# Patient Record
Sex: FEMALE | Race: WHITE | NOT HISPANIC OR LATINO | Employment: FULL TIME | URBAN - METROPOLITAN AREA
[De-identification: names, ages, dates, MRNs, and addresses within clinical notes are randomized per-mention and may not be internally consistent; named-entity substitution may affect disease eponyms.]

---

## 2019-10-24 ENCOUNTER — OFFICE VISIT (OUTPATIENT)
Dept: OBSTETRICS AND GYNECOLOGY | Facility: CLINIC | Age: 29
End: 2019-10-24
Payer: COMMERCIAL

## 2019-10-24 VITALS
SYSTOLIC BLOOD PRESSURE: 104 MMHG | HEIGHT: 62 IN | BODY MASS INDEX: 40.16 KG/M2 | DIASTOLIC BLOOD PRESSURE: 80 MMHG | WEIGHT: 218.25 LBS

## 2019-10-24 DIAGNOSIS — N90.7 LABIAL CYST: Primary | ICD-10-CM

## 2019-10-24 PROCEDURE — 99999 PR PBB SHADOW E&M-NEW PATIENT-LVL III: CPT | Mod: PBBFAC,,, | Performed by: NURSE PRACTITIONER

## 2019-10-24 PROCEDURE — 99203 OFFICE O/P NEW LOW 30 MIN: CPT | Mod: S$GLB,,, | Performed by: NURSE PRACTITIONER

## 2019-10-24 PROCEDURE — 99999 PR PBB SHADOW E&M-NEW PATIENT-LVL III: ICD-10-PCS | Mod: PBBFAC,,, | Performed by: NURSE PRACTITIONER

## 2019-10-24 PROCEDURE — 99203 PR OFFICE/OUTPT VISIT, NEW, LEVL III, 30-44 MIN: ICD-10-PCS | Mod: S$GLB,,, | Performed by: NURSE PRACTITIONER

## 2019-10-24 PROCEDURE — 3008F BODY MASS INDEX DOCD: CPT | Mod: CPTII,S$GLB,, | Performed by: NURSE PRACTITIONER

## 2019-10-24 PROCEDURE — 3008F PR BODY MASS INDEX (BMI) DOCUMENTED: ICD-10-PCS | Mod: CPTII,S$GLB,, | Performed by: NURSE PRACTITIONER

## 2019-10-24 NOTE — PROGRESS NOTES
Chief Complaint   Patient presents with    Vaginal Pain       History of Present Illness: Radha Kee is a 29 y.o. female that presents today 10/24/2019   Pt [facility] c/o vaginal pain and swelling x 3 days. She denies that anything occurred prior to the pain and swelling. Her  has been out of town for 2 weeks, so she has not been sexually active. She denies any vaginal itching, burning, discharge or odor. No other complaints or concerns noted.       Past Medical History:   Diagnosis Date    Allergy     Migraine headache        Past Surgical History:   Procedure Laterality Date    BUNIONECTOMY      bilateral     BUNIONECTOMY      both feet    bunions  2005       Current Outpatient Medications   Medication Sig Dispense Refill    azithromycin (ZITHROMAX Z-ELVIS) 250 MG tablet Take 2 tabs the first day and 1 tab qd thereafter. 6 tablet 0    norethindrone-ethinyl estradiol (JUNEL FE 1/20, 28,) 1 mg-20 mcg (21)/75 mg (7) per tablet Take 1 tablet by mouth once daily. 84 tablet 4    pseudoephedrine-guaifenesin  mg (MUCINEX D)  mg per tablet Take 1 tablet by mouth 2 (two) times daily.       No current facility-administered medications for this visit.        Review of patient's allergies indicates:   Allergen Reactions    Bactrim [sulfamethoxazole-trimethoprim] Hives     Other reaction(s): Hives    Penicillins      Other reaction(s): Hives    Sulfa (sulfonamide antibiotics)        Family History   Problem Relation Age of Onset    Hypertension Mother     Liver disease Father     Cataracts Father     Amenorrhea Sister     Cancer Maternal Grandmother     Breast cancer Maternal Grandmother         dx age 50's or 60's    Breast cancer Other 60        paternal great aunt    Acne Brother     Diabetes Maternal Grandfather     Cancer Maternal Grandfather         skin    Hypertension Maternal Grandfather     Stroke Paternal Grandfather     Diabetes Paternal  "Grandmother     Hypertension Paternal Grandmother     Amblyopia Neg Hx     Blindness Neg Hx     Glaucoma Neg Hx     Macular degeneration Neg Hx     Retinal detachment Neg Hx     Strabismus Neg Hx     Thyroid disease Neg Hx     Colon cancer Neg Hx     Ovarian cancer Neg Hx     Psoriasis Neg Hx     Lupus Neg Hx     Eczema Neg Hx        Social History     Tobacco Use    Smoking status: Never Smoker   Substance Use Topics    Alcohol use: Yes     Comment: social    Drug use: No       OB History    Para Term  AB Living   0 0 0 0 0 0   SAB TAB Ectopic Multiple Live Births   0 0 0 0         Review of Symptoms:  GENERAL: Denies weight gain or weight loss. Feeling well overall.   SKIN: Denies rash or lesions.   HEAD: Denies head injury or headache.   NODES: Denies enlarged lymph nodes.   CHEST: Denies chest pain or shortness of breath.   CARDIOVASCULAR: Denies palpitations or left sided chest pain.   ABDOMEN: No abdominal pain, constipation, diarrhea, nausea, vomiting or rectal bleeding.   URINARY: No frequency, dysuria, hematuria, or burning on urination.    /80   Ht 5' 2.01" (1.575 m)   Wt 99 kg (218 lb 4.1 oz)   LMP 10/24/2019   Physical Exam:  APPEARANCE: Well nourished, well developed, in no acute distress.  SKIN: Normal skin turgor, no lesions.  NECK: Neck symmetric without masses   RESPIRATORY: Normal respiratory effort with no retractions or use of accessory muscles  ABDOMEN: Soft. No tenderness or masses. No hepatosplenomegaly. No hernias.  PELVIC: Normal external female genitalia without lesions; small approx 2cm cyst noted on right labia minora - oozing yellow/brown exudate with minimal pressure applied. Normal hair distribution. Adequate perineal body, normal urethral meatus. Urethra with no masses.  Bladder nontender. Vagina moist and well rugated without lesions or discharge. Cervix pink and without lesions. No significant cystocele or rectocele. "     ASSESSMENT/PLAN:  Labial cyst      -D/w pt that area of discomfort is a labial cyst and has already started to resolve. Exudate drained from cyst with minimal pressure. Cyst should completely resolve on its own. If it does not resolve or worsens please f/u. Pt verbalized understanding.    Follow-up:  RTC if symptoms worsen or do not improve  RTC as needed

## 2019-10-24 NOTE — LETTER
October 24, 2019      Charmaine Frost PA-C  [address]  [facility] [address]           [facility] [address]  [address]  [facility] [address]  Phone: [phone]  Fax: [phone]          Patient: Radha Kee   MR Number: [id number]   YOB: 1990   Date of Visit: 10/24/2019       Dear Charmaine Frost:    Thank you for referring Radha Kee to me for evaluation. Attached you will find relevant portions of my assessment and plan of care.    If you have questions, please do not hesitate to call me. I look forward to following Radha Kee along with you.    Sincerely,    Elham Hernandez, NP    Enclosure  CC:  No Recipients    If you would like to receive this communication electronically, please contact [email or web address] or [phone] to request more information on [employer] access.    For providers and/or their staff who would like to refer a patient to [facility], please contact us through our one-stop-shop provider referral line, [facility], at [phone].    If you feel you have received this communication in error or would no longer like to receive these types of communications, please e-mail [email or web address]

## 2020-04-21 DIAGNOSIS — Z01.84 ANTIBODY RESPONSE EXAMINATION: ICD-10-CM

## 2020-05-20 ENCOUNTER — OFFICE VISIT (OUTPATIENT)
Dept: OBSTETRICS AND GYNECOLOGY | Facility: CLINIC | Age: 30
End: 2020-05-20
Payer: COMMERCIAL

## 2020-05-20 VITALS
WEIGHT: 227.94 LBS | SYSTOLIC BLOOD PRESSURE: 122 MMHG | HEIGHT: 62 IN | DIASTOLIC BLOOD PRESSURE: 78 MMHG | BODY MASS INDEX: 41.94 KG/M2

## 2020-05-20 DIAGNOSIS — Z01.419 ENCOUNTER FOR GYNECOLOGICAL EXAMINATION WITHOUT ABNORMAL FINDING: Primary | ICD-10-CM

## 2020-05-20 DIAGNOSIS — Z12.4 PAP SMEAR FOR CERVICAL CANCER SCREENING: ICD-10-CM

## 2020-05-20 PROCEDURE — 99999 PR PBB SHADOW E&M-EST. PATIENT-LVL III: ICD-10-PCS | Mod: PBBFAC,,, | Performed by: OBSTETRICS & GYNECOLOGY

## 2020-05-20 PROCEDURE — 99395 PR PREVENTIVE VISIT,EST,18-39: ICD-10-PCS | Mod: S$GLB,,, | Performed by: OBSTETRICS & GYNECOLOGY

## 2020-05-20 PROCEDURE — 88175 CYTOPATH C/V AUTO FLUID REDO: CPT

## 2020-05-20 PROCEDURE — 87624 HPV HI-RISK TYP POOLED RSLT: CPT

## 2020-05-20 PROCEDURE — 99395 PREV VISIT EST AGE 18-39: CPT | Mod: S$GLB,,, | Performed by: OBSTETRICS & GYNECOLOGY

## 2020-05-20 PROCEDURE — 99999 PR PBB SHADOW E&M-EST. PATIENT-LVL III: CPT | Mod: PBBFAC,,, | Performed by: OBSTETRICS & GYNECOLOGY

## 2020-05-20 NOTE — PROGRESS NOTES
Subjective:       Patient ID: Radha Kee is a 29 y.o. female.    Chief Complaint:  Well Woman and hx abnormal pap      History of Present Illness  HPI    Radha Kee is a 29 y.o. female  NEW TO ME here for her annual GYN exam.  She reports having had an abnormal Pap with + HR HPV last year which did not require treatment, did have a colposcopy.  She reports having had the Gardasil vaccine series as a teenager.    She describes her periods as regular, about every 35 days, normal flow, lasting 4 days.   denies break through bleeding.   denies vaginal itching or irritation.  Denies vaginal discharge.  She is sexually active. She has had 1 partner for about 14 years .(currently , but trying to work things out).  She uses no method for contraception.   History of abnormal pap: Yes -   Last Pap: approximate date  and was abnormal: LGSIL, + HPV  denies domestic violence. She does feel safe at home.     Past Medical History:   Diagnosis Date    Allergy     Migraine headache     without aura     Past Surgical History:   Procedure Laterality Date    BUNIONECTOMY      bilateral      Social History     Socioeconomic History    Marital status: Single     Spouse name: Not on file    Number of children: Not on file    Years of education: Not on file    Highest education level: Not on file   Occupational History    Occupation: Student   Social Needs    Financial resource strain: Not on file    Food insecurity:     Worry: Not on file     Inability: Not on file    Transportation needs:     Medical: Not on file     Non-medical: Not on file   Tobacco Use    Smoking status: Never Smoker    Smokeless tobacco: Never Used   Substance and Sexual Activity    Alcohol use: Yes     Alcohol/week: 1.0 standard drinks     Types: 1 Glasses of wine per week     Comment: social    Drug use: No    Sexual activity: Yes     Partners: Male     Birth control/protection: Condom     Comment: condom use  "occasional;current partner (ex ) x 14 years   Lifestyle    Physical activity:     Days per week: Not on file     Minutes per session: Not on file    Stress: Not on file   Relationships    Social connections:     Talks on phone: Not on file     Gets together: Not on file     Attends Mandaeism service: Not on file     Active member of club or organization: Not on file     Attends meetings of clubs or organizations: Not on file     Relationship status: Not on file   Other Topics Concern    Are you pregnant or think you may be? No    Breast-feeding No   Social History Narrative    Not on file     Family History   Problem Relation Age of Onset    Hypertension Mother     Liver disease Father     Cataracts Father     Amenorrhea Sister     Lymphoma Maternal Grandmother     Breast cancer Other 60        paternal great aunt    Acne Brother     Diabetes Maternal Grandfather     Cancer Maternal Grandfather         skin    Hypertension Maternal Grandfather     Stroke Paternal Grandfather     Heart attack Paternal Grandfather 50    Diabetes Maternal Aunt     Hypertension Maternal Aunt     Diabetes Maternal Uncle     Hypertension Maternal Uncle     Diabetes Maternal Aunt     Hypertension Maternal Aunt     Amblyopia Neg Hx     Blindness Neg Hx     Glaucoma Neg Hx     Macular degeneration Neg Hx     Retinal detachment Neg Hx     Strabismus Neg Hx     Thyroid disease Neg Hx     Colon cancer Neg Hx     Ovarian cancer Neg Hx     Psoriasis Neg Hx     Lupus Neg Hx     Eczema Neg Hx      OB History        0    Para   0    Term   0       0    AB   0    Living   0       SAB   0    TAB   0    Ectopic   0    Multiple   0    Live Births                     /78   Ht 5' 2" (1.575 m)   Wt 103.4 kg (227 lb 15.3 oz)   LMP 2020 (Approximate)   BMI 41.69 kg/m²         GYN & OB History  Patient's last menstrual period was 2020 (approximate).   Date of Last Pap: " 2013    OB History    Para Term  AB Living   0 0 0 0 0 0   SAB TAB Ectopic Multiple Live Births   0 0 0 0         Review of Systems  Review of Systems   Constitutional: Negative for activity change, appetite change, fatigue and unexpected weight change.   HENT: Negative.    Eyes: Negative for visual disturbance.   Respiratory: Negative for shortness of breath and wheezing.    Cardiovascular: Negative for chest pain, palpitations and leg swelling.   Gastrointestinal: Negative for abdominal pain, bloating and blood in stool.   Endocrine: Negative for diabetes and hair loss.   Genitourinary: Negative for decreased libido, dyspareunia, menorrhagia and menstrual problem.   Musculoskeletal: Negative for back pain and joint swelling.   Integumentary:  Negative for acne, hair changes and nipple discharge.   Neurological: Negative for headaches.   Hematological: Does not bruise/bleed easily.   Psychiatric/Behavioral: Negative for depression and sleep disturbance. The patient is not nervous/anxious.    Breast: Negative for mastodynia and nipple discharge          Objective:      Physical Exam:   Constitutional: She is oriented to person, place, and time. She appears well-developed and well-nourished.    HENT:   Head: Normocephalic and atraumatic.    Eyes: Pupils are equal, round, and reactive to light. EOM are normal.    Neck: Normal range of motion. Neck supple.    Cardiovascular: Normal rate and regular rhythm.     Pulmonary/Chest: Effort normal and breath sounds normal.   BREASTS:  no mass, no tenderness, no deformity and no retraction. Right breast exhibits no inverted nipple, no mass, no nipple discharge, no skin change, no tenderness, no bleeding and no swelling. Left breast exhibits no inverted nipple, no mass, no nipple discharge, no skin change, no tenderness, no bleeding and no swelling. Breasts are symmetrical.              Abdominal: Soft. Bowel sounds are normal.     Genitourinary: Pelvic exam  was performed with patient supine.   Genitourinary Comments: PELVIC: Normal external genitalia without lesions.  Normal hair distribution.  Adequate perineal body, normal urethral meatus.  Vagina moist and well rugated without lesions or discharge.  Cervix pink, without lesions, discharge or tenderness.  No significant cystocele or rectocele.  Bimanual exam shows uterus to be NOT PALPABLY ENLARGED, BUT DIFFICULT TO DELINEATE SECONDARY TO HABITUS, and nontender.  Adnexa without masses or tenderness.               Musculoskeletal: Normal range of motion and moves all extremeties.       Neurological: She is alert and oriented to person, place, and time.    Skin: Skin is warm and dry.    Psychiatric: She has a normal mood and affect.              Assessment:        1. Encounter for gynecological examination without abnormal finding    2. Pap smear for cervical cancer screening                Plan:        1. Encounter for gynecological examination without abnormal finding    COUNSELING:  The patient was counseled today on regular weight bearing exercise. Patient was counseled today on the new ACS guidelines for cervical cytology screening as well as the current recommendations for breast cancer screening. Counseling session lasted approximately 10 minutes, and all her questions were answered. She was advised to see her primary care physician for all other health maintenance.   FOLLOW-UP with me for next routine visit.         2. Pap smear for cervical cancer screening      - Liquid-Based Pap Smear, Screening  - HPV High Risk Genotypes, PCR       Follow up in about 1 year (around 5/20/2021).

## 2020-05-21 DIAGNOSIS — Z01.84 ANTIBODY RESPONSE EXAMINATION: ICD-10-CM

## 2020-05-25 LAB
FINAL PATHOLOGIC DIAGNOSIS: NORMAL
Lab: NORMAL

## 2020-05-27 LAB
HPV HR 12 DNA SPEC QL NAA+PROBE: POSITIVE
HPV16 AG SPEC QL: NEGATIVE
HPV18 DNA SPEC QL NAA+PROBE: NEGATIVE

## 2020-06-20 DIAGNOSIS — Z01.84 ANTIBODY RESPONSE EXAMINATION: ICD-10-CM

## 2020-07-20 DIAGNOSIS — Z01.84 ANTIBODY RESPONSE EXAMINATION: ICD-10-CM

## 2020-08-19 DIAGNOSIS — Z01.84 ANTIBODY RESPONSE EXAMINATION: ICD-10-CM

## 2020-09-01 ENCOUNTER — OFFICE VISIT (OUTPATIENT)
Dept: OPTOMETRY | Facility: CLINIC | Age: 30
End: 2020-09-01
Payer: COMMERCIAL

## 2020-09-01 DIAGNOSIS — H52.223 REGULAR ASTIGMATISM OF BOTH EYES: Primary | ICD-10-CM

## 2020-09-01 DIAGNOSIS — Z04.9 DISEASE RULED OUT AFTER EXAMINATION: ICD-10-CM

## 2020-09-01 PROCEDURE — 92004 COMPRE OPH EXAM NEW PT 1/>: CPT | Mod: S$GLB,,, | Performed by: OPTOMETRIST

## 2020-09-01 PROCEDURE — 92004 PR EYE EXAM, NEW PATIENT,COMPREHESV: ICD-10-PCS | Mod: S$GLB,,, | Performed by: OPTOMETRIST

## 2020-09-01 PROCEDURE — 99999 PR PBB SHADOW E&M-EST. PATIENT-LVL II: ICD-10-PCS | Mod: PBBFAC,,, | Performed by: OPTOMETRIST

## 2020-09-01 PROCEDURE — 99999 PR PBB SHADOW E&M-EST. PATIENT-LVL II: CPT | Mod: PBBFAC,,, | Performed by: OPTOMETRIST

## 2020-09-01 PROCEDURE — 92015 PR REFRACTION: ICD-10-PCS | Mod: S$GLB,,, | Performed by: OPTOMETRIST

## 2020-09-01 PROCEDURE — 92015 DETERMINE REFRACTIVE STATE: CPT | Mod: S$GLB,,, | Performed by: OPTOMETRIST

## 2020-09-01 NOTE — PROGRESS NOTES
HPI     Pt here for annual visit  KEVAN about 3 yrs ago  Seeing well out of SVL distance   Has headaches, floaters periodically  Patient denies diplopia, flashes, pain, and itching/burning/tearing.        Last edited by Radha Schumacher on 9/1/2020  8:33 AM. (History)            Assessment /Plan     For exam results, see Encounter Report.    Regular astigmatism of both eyes    Disease ruled out after examination      Rx specs    RTC 1 year, sooner PRN

## 2020-09-18 DIAGNOSIS — Z01.84 ANTIBODY RESPONSE EXAMINATION: ICD-10-CM

## 2020-10-18 DIAGNOSIS — Z01.84 ANTIBODY RESPONSE EXAMINATION: ICD-10-CM

## 2020-11-17 DIAGNOSIS — Z01.84 ANTIBODY RESPONSE EXAMINATION: ICD-10-CM

## 2020-11-18 ENCOUNTER — OFFICE VISIT (OUTPATIENT)
Dept: BARIATRICS | Facility: CLINIC | Age: 30
End: 2020-11-18
Payer: COMMERCIAL

## 2020-11-18 VITALS
WEIGHT: 237.13 LBS | BODY MASS INDEX: 42.02 KG/M2 | HEART RATE: 80 BPM | DIASTOLIC BLOOD PRESSURE: 60 MMHG | SYSTOLIC BLOOD PRESSURE: 102 MMHG | HEIGHT: 63 IN

## 2020-11-18 DIAGNOSIS — E66.01 CLASS 3 SEVERE OBESITY DUE TO EXCESS CALORIES WITHOUT SERIOUS COMORBIDITY WITH BODY MASS INDEX (BMI) OF 40.0 TO 44.9 IN ADULT: Primary | ICD-10-CM

## 2020-11-18 PROBLEM — E66.813 CLASS 3 SEVERE OBESITY DUE TO EXCESS CALORIES WITHOUT SERIOUS COMORBIDITY WITH BODY MASS INDEX (BMI) OF 40.0 TO 44.9 IN ADULT: Status: ACTIVE | Noted: 2020-11-18

## 2020-11-18 PROCEDURE — 1126F AMNT PAIN NOTED NONE PRSNT: CPT | Mod: S$GLB,,, | Performed by: STUDENT IN AN ORGANIZED HEALTH CARE EDUCATION/TRAINING PROGRAM

## 2020-11-18 PROCEDURE — 3008F PR BODY MASS INDEX (BMI) DOCUMENTED: ICD-10-PCS | Mod: CPTII,S$GLB,, | Performed by: STUDENT IN AN ORGANIZED HEALTH CARE EDUCATION/TRAINING PROGRAM

## 2020-11-18 PROCEDURE — 99202 PR OFFICE/OUTPT VISIT, NEW, LEVL II, 15-29 MIN: ICD-10-PCS | Mod: S$GLB,,, | Performed by: STUDENT IN AN ORGANIZED HEALTH CARE EDUCATION/TRAINING PROGRAM

## 2020-11-18 PROCEDURE — 99999 PR PBB SHADOW E&M-EST. PATIENT-LVL III: ICD-10-PCS | Mod: PBBFAC,,, | Performed by: STUDENT IN AN ORGANIZED HEALTH CARE EDUCATION/TRAINING PROGRAM

## 2020-11-18 PROCEDURE — 99202 OFFICE O/P NEW SF 15 MIN: CPT | Mod: S$GLB,,, | Performed by: STUDENT IN AN ORGANIZED HEALTH CARE EDUCATION/TRAINING PROGRAM

## 2020-11-18 PROCEDURE — 1126F PR PAIN SEVERITY QUANTIFIED, NO PAIN PRESENT: ICD-10-PCS | Mod: S$GLB,,, | Performed by: STUDENT IN AN ORGANIZED HEALTH CARE EDUCATION/TRAINING PROGRAM

## 2020-11-18 PROCEDURE — 3008F BODY MASS INDEX DOCD: CPT | Mod: CPTII,S$GLB,, | Performed by: STUDENT IN AN ORGANIZED HEALTH CARE EDUCATION/TRAINING PROGRAM

## 2020-11-18 PROCEDURE — 99999 PR PBB SHADOW E&M-EST. PATIENT-LVL III: CPT | Mod: PBBFAC,,, | Performed by: STUDENT IN AN ORGANIZED HEALTH CARE EDUCATION/TRAINING PROGRAM

## 2020-11-18 NOTE — PROGRESS NOTES
Subjective:       Patient ID: Radha Kee is a 30 y.o. female.    Chief Complaint: weight gain    Patient presents for treatment of obesity.   Parents are obese, and she has struggled with weight since she was a child. 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 She is now at her highest weight.     History of Weight Loss Efforts  [employer] program in middle school    Current Physical Activity  Not currently    Current Eating Habits  Breakfast - cereal, breakfast croissant  Lunch - fast food or leftovers  Dinner - fast food or take out  Snacks - usually in the evenings, cheez its, cookies, popcorn  Beverages - flavored marshall, 1-2 Pepsis/day    Eating out: almost daily, does not like to cook    Alcohol: 1-2 drinks/week    Tobacco: no    Sleep: 6-8 hours/night      Review of Systems   Constitutional: Negative for chills and fever.   HENT: Negative for sore throat and trouble swallowing.    Eyes: Negative for visual disturbance.        Last eye exam 2-3 months ago; negative for glaucoma   Respiratory: Negative for cough and shortness of breath.    Cardiovascular: Negative for chest pain, palpitations and leg swelling.   Gastrointestinal: Negative for abdominal pain, constipation, diarrhea, nausea, vomiting and reflux.   Endocrine: Negative for cold intolerance and heat intolerance.        Negative for thyroid cancer   Genitourinary: Positive for menstrual irregularity. Negative for difficulty urinating.        Negative for kidney stones; no contraception   Musculoskeletal: Negative for arthralgias and back pain.   Integumentary:  Negative for rash.   Allergic/Immunologic: Negative for immunocompromised state.   Neurological: Negative for dizziness, seizures, light-headedness and headaches.   Hematological: Does not bruise/bleed easily.   Psychiatric/Behavioral: The patient is not nervous/anxious.          Objective:       Weight 237.1 lbs  BMI 42.6  BFP 51.2%  SMM 65 lbs  BMR 1504 kcal    Vitals:    11/18/20  0925   BP: 102/60   Pulse: 80       Physical Exam  Vitals signs reviewed.   Constitutional:       General: She is not in acute distress.     Appearance: Normal appearance. She is obese. She is not ill-appearing, toxic-appearing or diaphoretic.   HENT:      Head: Normocephalic and atraumatic.   Eyes:      Extraocular Movements: Extraocular movements intact.   Neck:      Musculoskeletal: Normal range of motion and neck supple.   Cardiovascular:      Rate and Rhythm: Normal rate and regular rhythm.   Pulmonary:      Effort: Pulmonary effort is normal. No respiratory distress.      Breath sounds: Normal breath sounds.   Abdominal:      Palpations: Abdomen is soft.      Tenderness: There is no abdominal tenderness.   Musculoskeletal: Normal range of motion.      Right lower leg: No edema.      Left lower leg: No edema.   Skin:     General: Skin is warm and dry.   Neurological:      General: No focal deficit present.      Mental Status: She is alert and oriented to person, place, and time.      Gait: Gait normal.   Psychiatric:         Mood and Affect: Mood normal.         Behavior: Behavior normal.         Thought Content: Thought content normal.         Judgment: Judgment normal.         Assessment:       1. Class 3 severe obesity due to excess calories without serious comorbidity with body mass index (BMI) of 40.0 to 44.9 in adult        Plan:   - Patient opting not to take weight loss medications at this time.    - CMP, A1c, TSH, lipid panel, and vitamin D ;abs ordered    - Log all food and beverage intake with a daily calorie goal of 0362-7193 calories per day    - Moderate intensity aerobic exercise for 15-30 minutes 3-4x/week    - Return to clinic in 4 weeks

## 2020-11-19 ENCOUNTER — PATIENT MESSAGE (OUTPATIENT)
Dept: BARIATRICS | Facility: CLINIC | Age: 30
End: 2020-11-19

## 2020-12-16 ENCOUNTER — LAB VISIT (OUTPATIENT)
Dept: LAB | Facility: HOSPITAL | Age: 30
End: 2020-12-16
Attending: STUDENT IN AN ORGANIZED HEALTH CARE EDUCATION/TRAINING PROGRAM
Payer: COMMERCIAL

## 2020-12-16 DIAGNOSIS — E66.01 CLASS 3 SEVERE OBESITY DUE TO EXCESS CALORIES WITHOUT SERIOUS COMORBIDITY WITH BODY MASS INDEX (BMI) OF 40.0 TO 44.9 IN ADULT: ICD-10-CM

## 2020-12-16 LAB
25(OH)D3+25(OH)D2 SERPL-MCNC: 29 NG/ML (ref 30–96)
ALBUMIN SERPL BCP-MCNC: 4.1 G/DL (ref 3.5–5.2)
ALP SERPL-CCNC: 99 U/L (ref 55–135)
ALT SERPL W/O P-5'-P-CCNC: 24 U/L (ref 10–44)
ANION GAP SERPL CALC-SCNC: 9 MMOL/L (ref 8–16)
AST SERPL-CCNC: 21 U/L (ref 10–40)
BILIRUB SERPL-MCNC: 0.7 MG/DL (ref 0.1–1)
BUN SERPL-MCNC: 12 MG/DL (ref 6–20)
CALCIUM SERPL-MCNC: 9 MG/DL (ref 8.7–10.5)
CHLORIDE SERPL-SCNC: 104 MMOL/L (ref 95–110)
CHOLEST SERPL-MCNC: 139 MG/DL (ref 120–199)
CHOLEST/HDLC SERPL: 4.1 {RATIO} (ref 2–5)
CO2 SERPL-SCNC: 25 MMOL/L (ref 23–29)
CREAT SERPL-MCNC: 0.9 MG/DL (ref 0.5–1.4)
EST. GFR  (AFRICAN AMERICAN): >60 ML/MIN/1.73 M^2
EST. GFR  (NON AFRICAN AMERICAN): >60 ML/MIN/1.73 M^2
ESTIMATED AVG GLUCOSE: 94 MG/DL (ref 68–131)
GLUCOSE SERPL-MCNC: 97 MG/DL (ref 70–110)
HBA1C MFR BLD HPLC: 4.9 % (ref 4–5.6)
HDLC SERPL-MCNC: 34 MG/DL (ref 40–75)
HDLC SERPL: 24.5 % (ref 20–50)
LDLC SERPL CALC-MCNC: 91.6 MG/DL (ref 63–159)
NONHDLC SERPL-MCNC: 105 MG/DL
POTASSIUM SERPL-SCNC: 4.1 MMOL/L (ref 3.5–5.1)
PROT SERPL-MCNC: 8.1 G/DL (ref 6–8.4)
SODIUM SERPL-SCNC: 138 MMOL/L (ref 136–145)
TRIGL SERPL-MCNC: 67 MG/DL (ref 30–150)
TSH SERPL DL<=0.005 MIU/L-ACNC: 2.66 UIU/ML (ref 0.4–4)

## 2020-12-16 PROCEDURE — 82306 VITAMIN D 25 HYDROXY: CPT

## 2020-12-16 PROCEDURE — 83036 HEMOGLOBIN GLYCOSYLATED A1C: CPT

## 2020-12-16 PROCEDURE — 80061 LIPID PANEL: CPT

## 2020-12-16 PROCEDURE — 36415 COLL VENOUS BLD VENIPUNCTURE: CPT

## 2020-12-16 PROCEDURE — 84443 ASSAY THYROID STIM HORMONE: CPT

## 2020-12-16 PROCEDURE — 80053 COMPREHEN METABOLIC PANEL: CPT

## 2020-12-18 ENCOUNTER — OFFICE VISIT (OUTPATIENT)
Dept: BARIATRICS | Facility: CLINIC | Age: 30
End: 2020-12-18
Payer: COMMERCIAL

## 2020-12-18 VITALS
OXYGEN SATURATION: 98 % | WEIGHT: 225.63 LBS | DIASTOLIC BLOOD PRESSURE: 80 MMHG | HEART RATE: 100 BPM | SYSTOLIC BLOOD PRESSURE: 122 MMHG | BODY MASS INDEX: 40.61 KG/M2

## 2020-12-18 DIAGNOSIS — E66.01 CLASS 3 SEVERE OBESITY DUE TO EXCESS CALORIES WITHOUT SERIOUS COMORBIDITY WITH BODY MASS INDEX (BMI) OF 40.0 TO 44.9 IN ADULT: Primary | ICD-10-CM

## 2020-12-18 PROCEDURE — 99213 PR OFFICE/OUTPT VISIT, EST, LEVL III, 20-29 MIN: ICD-10-PCS | Mod: S$GLB,,, | Performed by: STUDENT IN AN ORGANIZED HEALTH CARE EDUCATION/TRAINING PROGRAM

## 2020-12-18 PROCEDURE — 3008F PR BODY MASS INDEX (BMI) DOCUMENTED: ICD-10-PCS | Mod: CPTII,S$GLB,, | Performed by: STUDENT IN AN ORGANIZED HEALTH CARE EDUCATION/TRAINING PROGRAM

## 2020-12-18 PROCEDURE — 1126F AMNT PAIN NOTED NONE PRSNT: CPT | Mod: S$GLB,,, | Performed by: STUDENT IN AN ORGANIZED HEALTH CARE EDUCATION/TRAINING PROGRAM

## 2020-12-18 PROCEDURE — 99999 PR PBB SHADOW E&M-EST. PATIENT-LVL III: CPT | Mod: PBBFAC,,, | Performed by: STUDENT IN AN ORGANIZED HEALTH CARE EDUCATION/TRAINING PROGRAM

## 2020-12-18 PROCEDURE — 3008F BODY MASS INDEX DOCD: CPT | Mod: CPTII,S$GLB,, | Performed by: STUDENT IN AN ORGANIZED HEALTH CARE EDUCATION/TRAINING PROGRAM

## 2020-12-18 PROCEDURE — 99213 OFFICE O/P EST LOW 20 MIN: CPT | Mod: S$GLB,,, | Performed by: STUDENT IN AN ORGANIZED HEALTH CARE EDUCATION/TRAINING PROGRAM

## 2020-12-18 PROCEDURE — 1126F PR PAIN SEVERITY QUANTIFIED, NO PAIN PRESENT: ICD-10-PCS | Mod: S$GLB,,, | Performed by: STUDENT IN AN ORGANIZED HEALTH CARE EDUCATION/TRAINING PROGRAM

## 2020-12-18 PROCEDURE — 99999 PR PBB SHADOW E&M-EST. PATIENT-LVL III: ICD-10-PCS | Mod: PBBFAC,,, | Performed by: STUDENT IN AN ORGANIZED HEALTH CARE EDUCATION/TRAINING PROGRAM

## 2021-01-19 ENCOUNTER — OFFICE VISIT (OUTPATIENT)
Dept: BARIATRICS | Facility: CLINIC | Age: 31
End: 2021-01-19
Payer: COMMERCIAL

## 2021-01-19 VITALS
SYSTOLIC BLOOD PRESSURE: 120 MMHG | HEIGHT: 62 IN | HEART RATE: 102 BPM | DIASTOLIC BLOOD PRESSURE: 72 MMHG | OXYGEN SATURATION: 98 % | BODY MASS INDEX: 41.26 KG/M2

## 2021-01-19 DIAGNOSIS — E66.01 CLASS 3 SEVERE OBESITY DUE TO EXCESS CALORIES WITHOUT SERIOUS COMORBIDITY WITH BODY MASS INDEX (BMI) OF 40.0 TO 44.9 IN ADULT: Primary | ICD-10-CM

## 2021-01-19 PROCEDURE — 99999 PR PBB SHADOW E&M-EST. PATIENT-LVL III: CPT | Mod: PBBFAC,,, | Performed by: STUDENT IN AN ORGANIZED HEALTH CARE EDUCATION/TRAINING PROGRAM

## 2021-01-19 PROCEDURE — 99999 PR PBB SHADOW E&M-EST. PATIENT-LVL III: ICD-10-PCS | Mod: PBBFAC,,, | Performed by: STUDENT IN AN ORGANIZED HEALTH CARE EDUCATION/TRAINING PROGRAM

## 2021-01-19 PROCEDURE — 99213 OFFICE O/P EST LOW 20 MIN: CPT | Mod: S$GLB,,, | Performed by: STUDENT IN AN ORGANIZED HEALTH CARE EDUCATION/TRAINING PROGRAM

## 2021-01-19 PROCEDURE — 3008F BODY MASS INDEX DOCD: CPT | Mod: CPTII,S$GLB,, | Performed by: STUDENT IN AN ORGANIZED HEALTH CARE EDUCATION/TRAINING PROGRAM

## 2021-01-19 PROCEDURE — 1125F PR PAIN SEVERITY QUANTIFIED, PAIN PRESENT: ICD-10-PCS | Mod: S$GLB,,, | Performed by: STUDENT IN AN ORGANIZED HEALTH CARE EDUCATION/TRAINING PROGRAM

## 2021-01-19 PROCEDURE — 99213 PR OFFICE/OUTPT VISIT, EST, LEVL III, 20-29 MIN: ICD-10-PCS | Mod: S$GLB,,, | Performed by: STUDENT IN AN ORGANIZED HEALTH CARE EDUCATION/TRAINING PROGRAM

## 2021-01-19 PROCEDURE — 1125F AMNT PAIN NOTED PAIN PRSNT: CPT | Mod: S$GLB,,, | Performed by: STUDENT IN AN ORGANIZED HEALTH CARE EDUCATION/TRAINING PROGRAM

## 2021-01-19 PROCEDURE — 3008F PR BODY MASS INDEX (BMI) DOCUMENTED: ICD-10-PCS | Mod: CPTII,S$GLB,, | Performed by: STUDENT IN AN ORGANIZED HEALTH CARE EDUCATION/TRAINING PROGRAM

## 2021-04-16 ENCOUNTER — PATIENT MESSAGE (OUTPATIENT)
Dept: RESEARCH | Facility: HOSPITAL | Age: 31
End: 2021-04-16

## 2021-05-04 ENCOUNTER — OFFICE VISIT (OUTPATIENT)
Dept: DERMATOLOGY | Facility: CLINIC | Age: 31
End: 2021-05-04
Payer: COMMERCIAL

## 2021-05-04 DIAGNOSIS — Z12.83 SCREENING EXAM FOR SKIN CANCER: ICD-10-CM

## 2021-05-04 DIAGNOSIS — D23.9 DERMATOFIBROMA: ICD-10-CM

## 2021-05-04 DIAGNOSIS — D22.9 MULTIPLE BENIGN NEVI: ICD-10-CM

## 2021-05-04 DIAGNOSIS — L82.1 SK (SEBORRHEIC KERATOSIS): ICD-10-CM

## 2021-05-04 DIAGNOSIS — D18.01 ANGIOMA OF SKIN: Primary | ICD-10-CM

## 2021-05-04 PROCEDURE — 1126F PR PAIN SEVERITY QUANTIFIED, NO PAIN PRESENT: ICD-10-PCS | Mod: S$GLB,,, | Performed by: DERMATOLOGY

## 2021-05-04 PROCEDURE — 1126F AMNT PAIN NOTED NONE PRSNT: CPT | Mod: S$GLB,,, | Performed by: DERMATOLOGY

## 2021-05-04 PROCEDURE — 99999 PR PBB SHADOW E&M-EST. PATIENT-LVL II: ICD-10-PCS | Mod: PBBFAC,,, | Performed by: DERMATOLOGY

## 2021-05-04 PROCEDURE — 99999 PR PBB SHADOW E&M-EST. PATIENT-LVL II: CPT | Mod: PBBFAC,,, | Performed by: DERMATOLOGY

## 2021-05-04 PROCEDURE — 99203 OFFICE O/P NEW LOW 30 MIN: CPT | Mod: S$GLB,,, | Performed by: DERMATOLOGY

## 2021-05-04 PROCEDURE — 99203 PR OFFICE/OUTPT VISIT, NEW, LEVL III, 30-44 MIN: ICD-10-PCS | Mod: S$GLB,,, | Performed by: DERMATOLOGY

## 2021-05-16 NOTE — PROGRESS NOTES
Critical care progress note:  Patient transferred per cardiology from [facility] for further cardiac work up and EP cardiology evaluation.  V fib shocked today, now stable on amiodarone drip.  Has been treated for atrial fibrillation on this admission.  Does not currently require critical care management.  Please call Intensivist on call if requires critical care evaluation/management.  Contacted triage officer to assign hospitalist.  Nursing to call cardiology.  Patient was seen by Dr Hurtado Pulmonary Critical Care MD at [facility].         Subjective:       Patient ID: Zainab Lozano is a 30 y.o. female.    Chief Complaint: No chief complaint on file.    Patient presents for treatment of obesity.   Follow-up, appointment #2    Diet Recall:  Working with dietician  Meal planning and logging  Reduced sodium  Cut out soft drinks     Current Physical Activity  Not exercising consistently, but trying to walk and move more       Medical Weight Loss History  11/18/2020:  237.1 lbs, BMI 42.6, BFP 51.2%, SMM 65 lbs  12/18/2020: 225.6 lbs, BMI 40.6, BFP 51.1%, SMM 62.2 lbs      Review of Systems   Constitutional: Negative for chills and fever.   HENT: Negative for sore throat and trouble swallowing.    Eyes: Negative for visual disturbance.   Respiratory: Negative for shortness of breath.    Cardiovascular: Negative for chest pain and palpitations.   Gastrointestinal: Negative for abdominal pain, nausea and vomiting.   Integumentary:  Negative for rash.   Neurological: Negative for dizziness and light-headedness.   Psychiatric/Behavioral: The patient is not nervous/anxious.          Objective:       Results for ZAINAB LOZANO (MRN [id number]) as of 12/18/2020 09:36   Ref. Range 12/16/2020 07:49   Sodium Latest Ref Range: 136 - 145 mmol/L 138   Potassium Latest Ref Range: 3.5 - 5.1 mmol/L 4.1   Chloride Latest Ref Range: 95 - 110 mmol/L 104   CO2 Latest Ref Range: 23 - 29 mmol/L 25   Anion Gap Latest Ref Range: 8 - 16 mmol/L 9   BUN Latest Ref Range: 6 - 20 mg/dL 12   Creatinine Latest Ref Range: 0.5 - 1.4 mg/dL 0.9   eGFR if non African American Latest Ref Range: >60 mL/min/1.73 m^2 >60.0   eGFR if African American Latest Ref Range: >60 mL/min/1.73 m^2 >60.0   Glucose Latest Ref Range: 70 - 110 mg/dL 97   Calcium Latest Ref Range: 8.7 - 10.5 mg/dL 9.0   Alkaline Phosphatase Latest Ref Range: 55 - 135 U/L 99   PROTEIN TOTAL Latest Ref Range: 6.0 - 8.4 g/dL 8.1   Albumin Latest Ref Range: 3.5 - 5.2 g/dL 4.1   BILIRUBIN TOTAL Latest Ref Range: 0.1 - 1.0 mg/dL 0.7   AST  Latest Ref Range: 10 - 40 U/L 21   ALT Latest Ref Range: 10 - 44 U/L 24   Triglycerides Latest Ref Range: 30 - 150 mg/dL 67   Cholesterol Latest Ref Range: 120 - 199 mg/dL 139   HDL Latest Ref Range: 40 - 75 mg/dL 34 (L)   HDL/Cholesterol Ratio Latest Ref Range: 20.0 - 50.0 % 24.5   LDL Cholesterol External Latest Ref Range: 63.0 - 159.0 mg/dL 91.6   Non-HDL Cholesterol Latest Units: mg/dL 105   Total Cholesterol/HDL Ratio Latest Ref Range: 2.0 - 5.0  4.1   Vit D, 25-Hydroxy Latest Ref Range: 30 - 96 ng/mL 29 (L)   Hemoglobin A1C External Latest Ref Range: 4.0 - 5.6 % 4.9   Estimated Avg Glucose Latest Ref Range: 68 - 131 mg/dL 94   TSH Latest Ref Range: 0.400 - 4.000 uIU/mL 2.658     Weight 225.6 lbs  BMI 40.6  BFP 51.1%  SMM 62.2 lbs  BMR 1452 kcal    Vitals:    12/18/20 0917   BP: 122/80   Pulse: 100       Physical Exam  Vitals signs reviewed.   Constitutional:       General: She is not in acute distress.     Appearance: Normal appearance. She is obese. She is not ill-appearing, toxic-appearing or diaphoretic.   HENT:      Head: Normocephalic and atraumatic.   Eyes:      Extraocular Movements: Extraocular movements intact.   Neck:      Musculoskeletal: Normal range of motion.   Cardiovascular:      Rate and Rhythm: Normal rate.   Pulmonary:      Effort: Pulmonary effort is normal. No respiratory distress.   Musculoskeletal: Normal range of motion.      Right lower leg: No edema.      Left lower leg: No edema.   Skin:     General: Skin is warm and dry.   Neurological:      General: No focal deficit present.      Mental Status: She is alert and oriented to person, place, and time.      Gait: Gait normal.   Psychiatric:         Mood and Affect: Mood normal.         Behavior: Behavior normal.         Thought Content: Thought content normal.         Judgment: Judgment normal.         Assessment:       1. Class 3 severe obesity due to excess calories without serious comorbidity with body mass index (BMI) of 40.0 to  44.9 in adult        Plan:   - Patient opting not to take weight loss medications at this time.      - Log all food and beverage intake with a daily calorie goal of 9606-6040 calories per day     - Moderate intensity aerobic exercise for 15-30 minutes 3-4x/week     - Return to clinic in 4 weeks

## 2021-07-24 ENCOUNTER — IMMUNIZATION (OUTPATIENT)
Dept: INTERNAL MEDICINE | Facility: CLINIC | Age: 31
End: 2021-07-24
Payer: COMMERCIAL

## 2021-07-24 DIAGNOSIS — Z23 NEED FOR VACCINATION: Primary | ICD-10-CM

## 2021-07-24 PROCEDURE — 91300 COVID-19, MRNA, LNP-S, PF, 30 MCG/0.3 ML DOSE VACCINE: CPT | Mod: PBBFAC | Performed by: INTERNAL MEDICINE

## 2021-08-04 ENCOUNTER — OFFICE VISIT (OUTPATIENT)
Dept: OBSTETRICS AND GYNECOLOGY | Facility: CLINIC | Age: 31
End: 2021-08-04
Payer: COMMERCIAL

## 2021-08-04 VITALS — SYSTOLIC BLOOD PRESSURE: 132 MMHG | HEIGHT: 62 IN | BODY MASS INDEX: 41.26 KG/M2 | DIASTOLIC BLOOD PRESSURE: 80 MMHG

## 2021-08-04 DIAGNOSIS — Z12.4 PAP SMEAR FOR CERVICAL CANCER SCREENING: ICD-10-CM

## 2021-08-04 DIAGNOSIS — Z01.419 ENCOUNTER FOR GYNECOLOGICAL EXAMINATION WITHOUT ABNORMAL FINDING: Primary | ICD-10-CM

## 2021-08-04 PROCEDURE — 1126F PR PAIN SEVERITY QUANTIFIED, NO PAIN PRESENT: ICD-10-PCS | Mod: CPTII,S$GLB,, | Performed by: OBSTETRICS & GYNECOLOGY

## 2021-08-04 PROCEDURE — 1159F PR MEDICATION LIST DOCUMENTED IN MEDICAL RECORD: ICD-10-PCS | Mod: CPTII,S$GLB,, | Performed by: OBSTETRICS & GYNECOLOGY

## 2021-08-04 PROCEDURE — 3075F PR MOST RECENT SYSTOLIC BLOOD PRESS GE 130-139MM HG: ICD-10-PCS | Mod: CPTII,S$GLB,, | Performed by: OBSTETRICS & GYNECOLOGY

## 2021-08-04 PROCEDURE — 87625 HPV TYPES 16 & 18 ONLY: CPT | Performed by: OBSTETRICS & GYNECOLOGY

## 2021-08-04 PROCEDURE — 3008F BODY MASS INDEX DOCD: CPT | Mod: CPTII,S$GLB,, | Performed by: OBSTETRICS & GYNECOLOGY

## 2021-08-04 PROCEDURE — 99999 PR PBB SHADOW E&M-EST. PATIENT-LVL III: ICD-10-PCS | Mod: PBBFAC,,, | Performed by: OBSTETRICS & GYNECOLOGY

## 2021-08-04 PROCEDURE — 99395 PR PREVENTIVE VISIT,EST,18-39: ICD-10-PCS | Mod: S$GLB,,, | Performed by: OBSTETRICS & GYNECOLOGY

## 2021-08-04 PROCEDURE — 87624 HPV HI-RISK TYP POOLED RSLT: CPT | Performed by: OBSTETRICS & GYNECOLOGY

## 2021-08-04 PROCEDURE — 1159F MED LIST DOCD IN RCRD: CPT | Mod: CPTII,S$GLB,, | Performed by: OBSTETRICS & GYNECOLOGY

## 2021-08-04 PROCEDURE — 99999 PR PBB SHADOW E&M-EST. PATIENT-LVL III: CPT | Mod: PBBFAC,,, | Performed by: OBSTETRICS & GYNECOLOGY

## 2021-08-04 PROCEDURE — 88175 CYTOPATH C/V AUTO FLUID REDO: CPT | Performed by: OBSTETRICS & GYNECOLOGY

## 2021-08-04 PROCEDURE — 1126F AMNT PAIN NOTED NONE PRSNT: CPT | Mod: CPTII,S$GLB,, | Performed by: OBSTETRICS & GYNECOLOGY

## 2021-08-04 PROCEDURE — 3079F PR MOST RECENT DIASTOLIC BLOOD PRESSURE 80-89 MM HG: ICD-10-PCS | Mod: CPTII,S$GLB,, | Performed by: OBSTETRICS & GYNECOLOGY

## 2021-08-04 PROCEDURE — 3008F PR BODY MASS INDEX (BMI) DOCUMENTED: ICD-10-PCS | Mod: CPTII,S$GLB,, | Performed by: OBSTETRICS & GYNECOLOGY

## 2021-08-04 PROCEDURE — 99395 PREV VISIT EST AGE 18-39: CPT | Mod: S$GLB,,, | Performed by: OBSTETRICS & GYNECOLOGY

## 2021-08-04 PROCEDURE — 3079F DIAST BP 80-89 MM HG: CPT | Mod: CPTII,S$GLB,, | Performed by: OBSTETRICS & GYNECOLOGY

## 2021-08-04 PROCEDURE — 1160F PR REVIEW ALL MEDS BY PRESCRIBER/CLIN PHARMACIST DOCUMENTED: ICD-10-PCS | Mod: CPTII,S$GLB,, | Performed by: OBSTETRICS & GYNECOLOGY

## 2021-08-04 PROCEDURE — 1160F RVW MEDS BY RX/DR IN RCRD: CPT | Mod: CPTII,S$GLB,, | Performed by: OBSTETRICS & GYNECOLOGY

## 2021-08-04 PROCEDURE — 3075F SYST BP GE 130 - 139MM HG: CPT | Mod: CPTII,S$GLB,, | Performed by: OBSTETRICS & GYNECOLOGY

## 2021-08-12 LAB
CLINICAL INFO: ABNORMAL
CYTO CVX: ABNORMAL
CYTOLOGIST CVX/VAG CYTO: ABNORMAL
CYTOLOGIST CVX/VAG CYTO: ABNORMAL
CYTOLOGY CMNT CVX/VAG CYTO-IMP: ABNORMAL
CYTOLOGY PAP THIN PREP EXPLANATION: ABNORMAL
DATE OF PREVIOUS PAP: ABNORMAL
DATE PREVIOUS BX: NO
GEN CATEG CVX/VAG CYTO-IMP: ABNORMAL
HPV I/H RISK 4 DNA CVX QL NAA+PROBE: DETECTED
HPV16 DNA CVX QL PROBE+SIG AMP: NOT DETECTED
HPV18 DNA CVX QL PROBE+SIG AMP: NOT DETECTED
LMP START DATE: ABNORMAL
MICROORGANISM CVX/VAG CYTO: ABNORMAL
PATHOLOGIST CVX/VAG CYTO: ABNORMAL
SERVICE CMNT-IMP: ABNORMAL
SPECIMEN SOURCE CVX/VAG CYTO: ABNORMAL
STAT OF ADQ CVX/VAG CYTO-IMP: ABNORMAL

## 2021-08-14 ENCOUNTER — IMMUNIZATION (OUTPATIENT)
Dept: INTERNAL MEDICINE | Facility: CLINIC | Age: 31
End: 2021-08-14
Payer: COMMERCIAL

## 2021-08-14 DIAGNOSIS — Z23 NEED FOR VACCINATION: Primary | ICD-10-CM

## 2021-08-14 PROCEDURE — 91300 COVID-19, MRNA, LNP-S, PF, 30 MCG/0.3 ML DOSE VACCINE: ICD-10-PCS | Mod: ,,, | Performed by: INTERNAL MEDICINE

## 2021-08-14 PROCEDURE — 0002A COVID-19, MRNA, LNP-S, PF, 30 MCG/0.3 ML DOSE VACCINE: ICD-10-PCS | Mod: CV19,,, | Performed by: INTERNAL MEDICINE

## 2021-08-14 PROCEDURE — 91300 COVID-19, MRNA, LNP-S, PF, 30 MCG/0.3 ML DOSE VACCINE: CPT | Mod: ,,, | Performed by: INTERNAL MEDICINE

## 2021-08-14 PROCEDURE — 0002A COVID-19, MRNA, LNP-S, PF, 30 MCG/0.3 ML DOSE VACCINE: CPT | Mod: CV19,,, | Performed by: INTERNAL MEDICINE

## 2021-08-24 ENCOUNTER — TELEPHONE (OUTPATIENT)
Dept: OBSTETRICS AND GYNECOLOGY | Facility: CLINIC | Age: 31
End: 2021-08-24

## 2021-08-27 ENCOUNTER — HOSPITAL ENCOUNTER (EMERGENCY)
Facility: HOSPITAL | Age: 31
Discharge: HOME | End: 2021-08-27
Attending: EMERGENCY MEDICINE
Payer: COMMERCIAL

## 2021-08-27 VITALS
WEIGHT: 230 LBS | TEMPERATURE: 99 F | HEIGHT: 62 IN | BODY MASS INDEX: 42.33 KG/M2 | RESPIRATION RATE: 16 BRPM | HEART RATE: 106 BPM | DIASTOLIC BLOOD PRESSURE: 73 MMHG | SYSTOLIC BLOOD PRESSURE: 118 MMHG | OXYGEN SATURATION: 100 %

## 2021-08-27 DIAGNOSIS — R10.12 LEFT UPPER QUADRANT ABDOMINAL PAIN: Primary | ICD-10-CM

## 2021-08-27 LAB
B-HCG UR QL: NEGATIVE
BACTERIA #/AREA URNS AUTO: ABNORMAL /HPF
BASOPHILS # BLD AUTO: 0.05 K/UL (ref 0–0.2)
BASOPHILS NFR BLD: 0.3 % (ref 0–1.9)
BILIRUB UR QL STRIP: NEGATIVE
BUN SERPL-MCNC: 13 MG/DL (ref 6–30)
CHLORIDE SERPL-SCNC: 106 MMOL/L (ref 95–110)
CLARITY UR REFRACT.AUTO: CLEAR
COLOR UR AUTO: YELLOW
CREAT SERPL-MCNC: 0.7 MG/DL (ref 0.5–1.4)
CTP QC/QA: YES
DIFFERENTIAL METHOD: ABNORMAL
EOSINOPHIL # BLD AUTO: 0 K/UL (ref 0–0.5)
EOSINOPHIL NFR BLD: 0.2 % (ref 0–8)
ERYTHROCYTE [DISTWIDTH] IN BLOOD BY AUTOMATED COUNT: 13.5 % (ref 11.5–14.5)
GLUCOSE SERPL-MCNC: 138 MG/DL (ref 70–110)
GLUCOSE UR QL STRIP: NEGATIVE
HCT VFR BLD AUTO: 39.3 % (ref 37–48.5)
HCT VFR BLD CALC: 41 %PCV (ref 36–54)
HGB BLD-MCNC: 13.7 G/DL (ref 12–16)
HGB UR QL STRIP: ABNORMAL
IMM GRANULOCYTES # BLD AUTO: 0.09 K/UL (ref 0–0.04)
IMM GRANULOCYTES NFR BLD AUTO: 0.5 % (ref 0–0.5)
KETONES UR QL STRIP: NEGATIVE
LEUKOCYTE ESTERASE UR QL STRIP: NEGATIVE
LYMPHOCYTES # BLD AUTO: 1.4 K/UL (ref 1–4.8)
LYMPHOCYTES NFR BLD: 7.4 % (ref 18–48)
MCH RBC QN AUTO: 30.5 PG (ref 27–31)
MCHC RBC AUTO-ENTMCNC: 34.9 G/DL (ref 32–36)
MCV RBC AUTO: 88 FL (ref 82–98)
MICROSCOPIC COMMENT: ABNORMAL
MONOCYTES # BLD AUTO: 0.9 K/UL (ref 0.3–1)
MONOCYTES NFR BLD: 4.7 % (ref 4–15)
NEUTROPHILS # BLD AUTO: 16.1 K/UL (ref 1.8–7.7)
NEUTROPHILS NFR BLD: 86.9 % (ref 38–73)
NITRITE UR QL STRIP: NEGATIVE
NRBC BLD-RTO: 0 /100 WBC
PH UR STRIP: 6 [PH] (ref 5–8)
PLATELET # BLD AUTO: 336 K/UL (ref 150–450)
PMV BLD AUTO: 10 FL (ref 9.2–12.9)
POC IONIZED CALCIUM: 1.12 MMOL/L (ref 1.06–1.42)
POC TCO2 (MEASURED): 21 MMOL/L (ref 23–29)
POTASSIUM BLD-SCNC: 3.9 MMOL/L (ref 3.5–5.1)
PROT UR QL STRIP: NEGATIVE
RBC # BLD AUTO: 4.49 M/UL (ref 4–5.4)
RBC #/AREA URNS AUTO: 9 /HPF (ref 0–4)
SAMPLE: ABNORMAL
SODIUM BLD-SCNC: 138 MMOL/L (ref 136–145)
SP GR UR STRIP: 1.03 (ref 1–1.03)
SQUAMOUS #/AREA URNS AUTO: 2 /HPF
URN SPEC COLLECT METH UR: ABNORMAL
WBC # BLD AUTO: 18.56 K/UL (ref 3.9–12.7)
WBC #/AREA URNS AUTO: 1 /HPF (ref 0–5)

## 2021-08-27 PROCEDURE — 81001 URINALYSIS AUTO W/SCOPE: CPT | Performed by: NURSE PRACTITIONER

## 2021-08-27 PROCEDURE — 99284 EMERGENCY DEPT VISIT MOD MDM: CPT | Mod: ,,, | Performed by: EMERGENCY MEDICINE

## 2021-08-27 PROCEDURE — 96375 TX/PRO/DX INJ NEW DRUG ADDON: CPT

## 2021-08-27 PROCEDURE — 99284 PR EMERGENCY DEPT VISIT,LEVEL IV: ICD-10-PCS | Mod: ,,, | Performed by: EMERGENCY MEDICINE

## 2021-08-27 PROCEDURE — 25000003 PHARM REV CODE 250: Performed by: NURSE PRACTITIONER

## 2021-08-27 PROCEDURE — 25000003 PHARM REV CODE 250: Performed by: EMERGENCY MEDICINE

## 2021-08-27 PROCEDURE — 96376 TX/PRO/DX INJ SAME DRUG ADON: CPT

## 2021-08-27 PROCEDURE — 81025 URINE PREGNANCY TEST: CPT | Performed by: EMERGENCY MEDICINE

## 2021-08-27 PROCEDURE — 96374 THER/PROPH/DIAG INJ IV PUSH: CPT

## 2021-08-27 PROCEDURE — 80047 BASIC METABLC PNL IONIZED CA: CPT

## 2021-08-27 PROCEDURE — 63600175 PHARM REV CODE 636 W HCPCS: Performed by: EMERGENCY MEDICINE

## 2021-08-27 PROCEDURE — 96361 HYDRATE IV INFUSION ADD-ON: CPT

## 2021-08-27 PROCEDURE — 99285 EMERGENCY DEPT VISIT HI MDM: CPT | Mod: 25

## 2021-08-27 PROCEDURE — 25500020 PHARM REV CODE 255: Performed by: EMERGENCY MEDICINE

## 2021-08-27 PROCEDURE — 85025 COMPLETE CBC W/AUTO DIFF WBC: CPT | Performed by: NURSE PRACTITIONER

## 2021-08-27 RX ORDER — KETOROLAC TROMETHAMINE 30 MG/ML
15 INJECTION, SOLUTION INTRAMUSCULAR; INTRAVENOUS
Status: COMPLETED | OUTPATIENT
Start: 2021-08-27 | End: 2021-08-27

## 2021-08-27 RX ORDER — DICYCLOMINE HYDROCHLORIDE 20 MG/1
20 TABLET ORAL 2 TIMES DAILY
Qty: 20 TABLET | Refills: 0 | Status: SHIPPED | OUTPATIENT
Start: 2021-08-27 | End: 2021-09-26

## 2021-08-27 RX ORDER — ONDANSETRON 4 MG/1
4 TABLET, FILM COATED ORAL EVERY 8 HOURS PRN
Qty: 12 TABLET | Refills: 0 | Status: SHIPPED | OUTPATIENT
Start: 2021-08-27 | End: 2021-08-31

## 2021-08-27 RX ORDER — ONDANSETRON 2 MG/ML
4 INJECTION INTRAMUSCULAR; INTRAVENOUS
Status: COMPLETED | OUTPATIENT
Start: 2021-08-27 | End: 2021-08-27

## 2021-08-27 RX ORDER — MORPHINE SULFATE 2 MG/ML
6 INJECTION, SOLUTION INTRAMUSCULAR; INTRAVENOUS
Status: COMPLETED | OUTPATIENT
Start: 2021-08-27 | End: 2021-08-27

## 2021-08-27 RX ORDER — ONDANSETRON 4 MG/1
4 TABLET, ORALLY DISINTEGRATING ORAL
Status: COMPLETED | OUTPATIENT
Start: 2021-08-27 | End: 2021-08-27

## 2021-08-27 RX ORDER — MORPHINE SULFATE 4 MG/ML
4 INJECTION, SOLUTION INTRAMUSCULAR; INTRAVENOUS
Status: COMPLETED | OUTPATIENT
Start: 2021-08-27 | End: 2021-08-27

## 2021-08-27 RX ADMIN — MORPHINE SULFATE 4 MG: 4 INJECTION INTRAVENOUS at 04:08

## 2021-08-27 RX ADMIN — ONDANSETRON 4 MG: 4 TABLET, ORALLY DISINTEGRATING ORAL at 01:08

## 2021-08-27 RX ADMIN — ONDANSETRON 4 MG: 2 INJECTION INTRAMUSCULAR; INTRAVENOUS at 02:08

## 2021-08-27 RX ADMIN — MORPHINE SULFATE 6 MG: 2 INJECTION, SOLUTION INTRAMUSCULAR; INTRAVENOUS at 02:08

## 2021-08-27 RX ADMIN — SODIUM CHLORIDE 1000 ML: 0.9 INJECTION, SOLUTION INTRAVENOUS at 02:08

## 2021-08-27 RX ADMIN — KETOROLAC TROMETHAMINE 15 MG: 30 INJECTION, SOLUTION INTRAMUSCULAR; INTRAVENOUS at 04:08

## 2021-08-27 RX ADMIN — IOHEXOL 100 ML: 350 INJECTION, SOLUTION INTRAVENOUS at 04:08

## 2021-09-09 ENCOUNTER — PATIENT MESSAGE (OUTPATIENT)
Dept: OBSTETRICS AND GYNECOLOGY | Facility: CLINIC | Age: 31
End: 2021-09-09

## 2021-09-21 ENCOUNTER — PATIENT MESSAGE (OUTPATIENT)
Dept: OBSTETRICS AND GYNECOLOGY | Facility: CLINIC | Age: 31
End: 2021-09-21

## 2021-10-04 ENCOUNTER — PATIENT MESSAGE (OUTPATIENT)
Dept: OBSTETRICS AND GYNECOLOGY | Facility: CLINIC | Age: 31
End: 2021-10-04

## 2021-10-04 DIAGNOSIS — Z30.09 GENERAL COUNSELING AND ADVICE ON FEMALE CONTRACEPTION: Primary | ICD-10-CM

## 2021-10-05 ENCOUNTER — PROCEDURE VISIT (OUTPATIENT)
Dept: OBSTETRICS AND GYNECOLOGY | Facility: CLINIC | Age: 31
End: 2021-10-05
Attending: OBSTETRICS & GYNECOLOGY
Payer: COMMERCIAL

## 2021-10-05 VITALS
DIASTOLIC BLOOD PRESSURE: 87 MMHG | BODY MASS INDEX: 41.77 KG/M2 | SYSTOLIC BLOOD PRESSURE: 134 MMHG | WEIGHT: 227 LBS | HEIGHT: 62 IN | HEART RATE: 103 BPM

## 2021-10-05 DIAGNOSIS — R87.612 LGSIL ON PAP SMEAR OF CERVIX: Primary | ICD-10-CM

## 2021-10-05 DIAGNOSIS — R87.612 PAP SMEAR ABNORMALITY OF CERVIX WITH LGSIL: ICD-10-CM

## 2021-10-05 DIAGNOSIS — R87.810 CERVICAL HIGH RISK HPV (HUMAN PAPILLOMAVIRUS) TEST POSITIVE: ICD-10-CM

## 2021-10-05 PROCEDURE — 88342 CHG IMMUNOCYTOCHEMISTRY: ICD-10-PCS | Mod: 26,,, | Performed by: PATHOLOGY

## 2021-10-05 PROCEDURE — 88342 IMHCHEM/IMCYTCHM 1ST ANTB: CPT | Performed by: PATHOLOGY

## 2021-10-05 PROCEDURE — 88305 TISSUE EXAM BY PATHOLOGIST: CPT | Mod: 26,,, | Performed by: PATHOLOGY

## 2021-10-05 PROCEDURE — 88305 TISSUE EXAM BY PATHOLOGIST: ICD-10-PCS | Mod: 26,,, | Performed by: PATHOLOGY

## 2021-10-05 PROCEDURE — 88305 TISSUE EXAM BY PATHOLOGIST: CPT | Performed by: PATHOLOGY

## 2021-10-05 PROCEDURE — 57454 BX/CURETT OF CERVIX W/SCOPE: CPT | Mod: S$GLB,,, | Performed by: OBSTETRICS & GYNECOLOGY

## 2021-10-05 PROCEDURE — 57454 COLPOSCOPY W/BIOPSY AND ECC- TODAY: ICD-10-PCS | Mod: S$GLB,,, | Performed by: OBSTETRICS & GYNECOLOGY

## 2021-10-05 PROCEDURE — 88342 IMHCHEM/IMCYTCHM 1ST ANTB: CPT | Mod: 26,,, | Performed by: PATHOLOGY

## 2021-10-11 RX ORDER — NORETHINDRONE ACETATE AND ETHINYL ESTRADIOL AND FERROUS FUMARATE 1MG-20(24)
1 KIT ORAL DAILY
Qty: 28 TABLET | Refills: 12 | Status: SHIPPED | OUTPATIENT
Start: 2021-10-11 | End: 2022-05-05

## 2021-10-13 LAB
FINAL PATHOLOGIC DIAGNOSIS: NORMAL
GROSS: NORMAL
Lab: NORMAL

## 2021-10-26 ENCOUNTER — OFFICE VISIT (OUTPATIENT)
Dept: OPTOMETRY | Facility: CLINIC | Age: 31
End: 2021-10-26
Payer: COMMERCIAL

## 2021-10-26 DIAGNOSIS — H52.223 REGULAR ASTIGMATISM OF BOTH EYES: Primary | ICD-10-CM

## 2021-10-26 DIAGNOSIS — Z04.9 DISEASE RULED OUT AFTER EXAMINATION: ICD-10-CM

## 2021-10-26 PROCEDURE — 92015 PR REFRACTION: ICD-10-PCS | Mod: S$GLB,,, | Performed by: OPTOMETRIST

## 2021-10-26 PROCEDURE — 92014 COMPRE OPH EXAM EST PT 1/>: CPT | Mod: S$GLB,,, | Performed by: OPTOMETRIST

## 2021-10-26 PROCEDURE — 92015 DETERMINE REFRACTIVE STATE: CPT | Mod: S$GLB,,, | Performed by: OPTOMETRIST

## 2021-10-26 PROCEDURE — 99999 PR PBB SHADOW E&M-EST. PATIENT-LVL II: CPT | Mod: PBBFAC,,, | Performed by: OPTOMETRIST

## 2021-10-26 PROCEDURE — 92014 PR EYE EXAM, EST PATIENT,COMPREHESV: ICD-10-PCS | Mod: S$GLB,,, | Performed by: OPTOMETRIST

## 2021-10-26 PROCEDURE — 99999 PR PBB SHADOW E&M-EST. PATIENT-LVL II: ICD-10-PCS | Mod: PBBFAC,,, | Performed by: OPTOMETRIST

## 2021-11-10 ENCOUNTER — OFFICE VISIT (OUTPATIENT)
Dept: OTOLARYNGOLOGY | Facility: CLINIC | Age: 31
End: 2021-11-10
Payer: COMMERCIAL

## 2021-11-10 VITALS
BODY MASS INDEX: 41.41 KG/M2 | DIASTOLIC BLOOD PRESSURE: 81 MMHG | HEART RATE: 80 BPM | WEIGHT: 226.44 LBS | SYSTOLIC BLOOD PRESSURE: 118 MMHG

## 2021-11-10 DIAGNOSIS — R06.83 SNORING: Primary | ICD-10-CM

## 2021-11-10 PROCEDURE — 99999 PR PBB SHADOW E&M-EST. PATIENT-LVL III: ICD-10-PCS | Mod: PBBFAC,,, | Performed by: OTOLARYNGOLOGY

## 2021-11-10 PROCEDURE — 1160F RVW MEDS BY RX/DR IN RCRD: CPT | Mod: CPTII,S$GLB,, | Performed by: OTOLARYNGOLOGY

## 2021-11-10 PROCEDURE — 3008F PR BODY MASS INDEX (BMI) DOCUMENTED: ICD-10-PCS | Mod: CPTII,S$GLB,, | Performed by: OTOLARYNGOLOGY

## 2021-11-10 PROCEDURE — 3079F DIAST BP 80-89 MM HG: CPT | Mod: CPTII,S$GLB,, | Performed by: OTOLARYNGOLOGY

## 2021-11-10 PROCEDURE — 1159F MED LIST DOCD IN RCRD: CPT | Mod: CPTII,S$GLB,, | Performed by: OTOLARYNGOLOGY

## 2021-11-10 PROCEDURE — 3079F PR MOST RECENT DIASTOLIC BLOOD PRESSURE 80-89 MM HG: ICD-10-PCS | Mod: CPTII,S$GLB,, | Performed by: OTOLARYNGOLOGY

## 2021-11-10 PROCEDURE — 99203 PR OFFICE/OUTPT VISIT, NEW, LEVL III, 30-44 MIN: ICD-10-PCS | Mod: S$GLB,,, | Performed by: OTOLARYNGOLOGY

## 2021-11-10 PROCEDURE — 1159F PR MEDICATION LIST DOCUMENTED IN MEDICAL RECORD: ICD-10-PCS | Mod: CPTII,S$GLB,, | Performed by: OTOLARYNGOLOGY

## 2021-11-10 PROCEDURE — 99203 OFFICE O/P NEW LOW 30 MIN: CPT | Mod: S$GLB,,, | Performed by: OTOLARYNGOLOGY

## 2021-11-10 PROCEDURE — 3074F SYST BP LT 130 MM HG: CPT | Mod: CPTII,S$GLB,, | Performed by: OTOLARYNGOLOGY

## 2021-11-10 PROCEDURE — 99999 PR PBB SHADOW E&M-EST. PATIENT-LVL III: CPT | Mod: PBBFAC,,, | Performed by: OTOLARYNGOLOGY

## 2021-11-10 PROCEDURE — 3074F PR MOST RECENT SYSTOLIC BLOOD PRESSURE < 130 MM HG: ICD-10-PCS | Mod: CPTII,S$GLB,, | Performed by: OTOLARYNGOLOGY

## 2021-11-10 PROCEDURE — 1160F PR REVIEW ALL MEDS BY PRESCRIBER/CLIN PHARMACIST DOCUMENTED: ICD-10-PCS | Mod: CPTII,S$GLB,, | Performed by: OTOLARYNGOLOGY

## 2021-11-10 PROCEDURE — 3008F BODY MASS INDEX DOCD: CPT | Mod: CPTII,S$GLB,, | Performed by: OTOLARYNGOLOGY

## 2021-12-28 ENCOUNTER — TELEPHONE (OUTPATIENT)
Dept: SLEEP MEDICINE | Facility: CLINIC | Age: 31
End: 2021-12-28
Payer: COMMERCIAL

## 2022-02-21 ENCOUNTER — TELEPHONE (OUTPATIENT)
Dept: SLEEP MEDICINE | Facility: CLINIC | Age: 32
End: 2022-02-21
Payer: COMMERCIAL

## 2022-02-21 NOTE — TELEPHONE ENCOUNTER
Reached out to pt to remind them of their appointment for 10:00 am on 2/22/22. Also, reminded them to complete their sleep questionnaire prior to their visit and bring their sleep machine if they have one. Pt didn't answer, lvm.

## 2022-05-05 ENCOUNTER — TELEPHONE (OUTPATIENT)
Dept: INTERNAL MEDICINE | Facility: CLINIC | Age: 32
End: 2022-05-05

## 2022-05-05 ENCOUNTER — OFFICE VISIT (OUTPATIENT)
Dept: INTERNAL MEDICINE | Facility: CLINIC | Age: 32
End: 2022-05-05
Payer: COMMERCIAL

## 2022-05-05 VITALS
TEMPERATURE: 99 F | BODY MASS INDEX: 44.06 KG/M2 | DIASTOLIC BLOOD PRESSURE: 80 MMHG | SYSTOLIC BLOOD PRESSURE: 120 MMHG | OXYGEN SATURATION: 98 % | HEIGHT: 62 IN | WEIGHT: 239.44 LBS | HEART RATE: 92 BPM

## 2022-05-05 DIAGNOSIS — K21.9 GASTROESOPHAGEAL REFLUX DISEASE WITHOUT ESOPHAGITIS: ICD-10-CM

## 2022-05-05 DIAGNOSIS — Z00.00 ENCOUNTER FOR MEDICAL EXAMINATION TO ESTABLISH CARE: Primary | ICD-10-CM

## 2022-05-05 DIAGNOSIS — Z11.4 SCREENING FOR HIV (HUMAN IMMUNODEFICIENCY VIRUS): ICD-10-CM

## 2022-05-05 DIAGNOSIS — Z83.79 FAMILY HISTORY OF LIVER DISEASE: ICD-10-CM

## 2022-05-05 DIAGNOSIS — Z13.6 ENCOUNTER FOR LIPID SCREENING FOR CARDIOVASCULAR DISEASE: ICD-10-CM

## 2022-05-05 DIAGNOSIS — Z13.220 ENCOUNTER FOR LIPID SCREENING FOR CARDIOVASCULAR DISEASE: ICD-10-CM

## 2022-05-05 DIAGNOSIS — Z13.1 SCREENING FOR DIABETES MELLITUS: ICD-10-CM

## 2022-05-05 DIAGNOSIS — E66.01 CLASS 3 SEVERE OBESITY DUE TO EXCESS CALORIES WITHOUT SERIOUS COMORBIDITY WITH BODY MASS INDEX (BMI) OF 40.0 TO 44.9 IN ADULT: ICD-10-CM

## 2022-05-05 DIAGNOSIS — R07.89 OTHER CHEST PAIN: ICD-10-CM

## 2022-05-05 DIAGNOSIS — Z11.59 ENCOUNTER FOR HEPATITIS C SCREENING TEST FOR LOW RISK PATIENT: ICD-10-CM

## 2022-05-05 PROCEDURE — 3008F PR BODY MASS INDEX (BMI) DOCUMENTED: ICD-10-PCS | Mod: CPTII,S$GLB,, | Performed by: NURSE PRACTITIONER

## 2022-05-05 PROCEDURE — 3079F PR MOST RECENT DIASTOLIC BLOOD PRESSURE 80-89 MM HG: ICD-10-PCS | Mod: CPTII,S$GLB,, | Performed by: NURSE PRACTITIONER

## 2022-05-05 PROCEDURE — 3008F BODY MASS INDEX DOCD: CPT | Mod: CPTII,S$GLB,, | Performed by: NURSE PRACTITIONER

## 2022-05-05 PROCEDURE — 99395 PR PREVENTIVE VISIT,EST,18-39: ICD-10-PCS | Mod: S$GLB,,, | Performed by: NURSE PRACTITIONER

## 2022-05-05 PROCEDURE — 1159F MED LIST DOCD IN RCRD: CPT | Mod: CPTII,S$GLB,, | Performed by: NURSE PRACTITIONER

## 2022-05-05 PROCEDURE — 1159F PR MEDICATION LIST DOCUMENTED IN MEDICAL RECORD: ICD-10-PCS | Mod: CPTII,S$GLB,, | Performed by: NURSE PRACTITIONER

## 2022-05-05 PROCEDURE — 93005 ELECTROCARDIOGRAM TRACING: CPT | Mod: S$GLB,,, | Performed by: NURSE PRACTITIONER

## 2022-05-05 PROCEDURE — 99395 PREV VISIT EST AGE 18-39: CPT | Mod: S$GLB,,, | Performed by: NURSE PRACTITIONER

## 2022-05-05 PROCEDURE — 99999 PR PBB SHADOW E&M-EST. PATIENT-LVL III: CPT | Mod: PBBFAC,,, | Performed by: NURSE PRACTITIONER

## 2022-05-05 PROCEDURE — 93010 EKG 12-LEAD: ICD-10-PCS | Mod: S$GLB,,, | Performed by: INTERNAL MEDICINE

## 2022-05-05 PROCEDURE — 3074F SYST BP LT 130 MM HG: CPT | Mod: CPTII,S$GLB,, | Performed by: NURSE PRACTITIONER

## 2022-05-05 PROCEDURE — 1160F PR REVIEW ALL MEDS BY PRESCRIBER/CLIN PHARMACIST DOCUMENTED: ICD-10-PCS | Mod: CPTII,S$GLB,, | Performed by: NURSE PRACTITIONER

## 2022-05-05 PROCEDURE — 93010 ELECTROCARDIOGRAM REPORT: CPT | Mod: S$GLB,,, | Performed by: INTERNAL MEDICINE

## 2022-05-05 PROCEDURE — 93005 EKG 12-LEAD: ICD-10-PCS | Mod: S$GLB,,, | Performed by: NURSE PRACTITIONER

## 2022-05-05 PROCEDURE — 3079F DIAST BP 80-89 MM HG: CPT | Mod: CPTII,S$GLB,, | Performed by: NURSE PRACTITIONER

## 2022-05-05 PROCEDURE — 99999 PR PBB SHADOW E&M-EST. PATIENT-LVL III: ICD-10-PCS | Mod: PBBFAC,,, | Performed by: NURSE PRACTITIONER

## 2022-05-05 PROCEDURE — 1160F RVW MEDS BY RX/DR IN RCRD: CPT | Mod: CPTII,S$GLB,, | Performed by: NURSE PRACTITIONER

## 2022-05-05 PROCEDURE — 3074F PR MOST RECENT SYSTOLIC BLOOD PRESSURE < 130 MM HG: ICD-10-PCS | Mod: CPTII,S$GLB,, | Performed by: NURSE PRACTITIONER

## 2022-05-05 RX ORDER — PANTOPRAZOLE SODIUM 20 MG/1
20 TABLET, DELAYED RELEASE ORAL
Qty: 28 TABLET | Refills: 0 | Status: SHIPPED | OUTPATIENT
Start: 2022-05-05 | End: 2022-05-05

## 2022-05-05 RX ORDER — PANTOPRAZOLE SODIUM 20 MG/1
20 TABLET, DELAYED RELEASE ORAL
Qty: 28 TABLET | Refills: 0 | Status: SHIPPED | OUTPATIENT
Start: 2022-05-05 | End: 2022-09-02

## 2022-05-05 NOTE — PROGRESS NOTES
[facility] Note    Chief Complaint      Chief Complaint   Patient presents with    Gastroesophageal Reflux     History of Present Illness      Radha Kee is a 31 y.o. female patient who is new to me and presents today for establish care. Pt denies sob or cp, reviewed meds and history with pt. Normal bowel and bladder function, has gone to bariatric med but hasn't been successful with weight loss yet. Has been very stressed, she is taking care of her grandmother and aunt at this time.   Father had BETANCOURT, transplant and passed in 2014  Pt c/o GERD, usually takes OTC but still with symptoms  ekg- chest discomfort to mid chest, under left breast and under left scapula.     Pt works for [employer]  All v/s reviewed    Labs-ordered  Eye exams- glasses  Gyn- [facility]        Vaccines:  covid-pfizer x 2  Flu shot-1/2022  Tdap- due    Health Maintenance   Topic Date Due    Hepatitis C Screening  Never done    TETANUS VACCINE  Never done    Pap Smear  08/04/2024    Lipid Panel  Completed       Past Medical History:   Diagnosis Date    Abnormal Pap smear of cervix 2019    + HR HPV    Allergy     History of acne     Migraine headache     without aura       Past Surgical History:   Procedure Laterality Date    BUNIONECTOMY  2005    bilateral        family history includes Acne in her brother; Amenorrhea in her sister; Breast cancer (age of onset: 60) in her other; Cancer in her maternal grandfather; Cataracts in her father; Diabetes in her maternal aunt, maternal aunt, maternal grandfather, and maternal uncle; Heart attack (age of onset: 50) in her paternal grandfather; Hypertension in her maternal aunt, maternal aunt, maternal grandfather, maternal uncle, and mother; Liver disease in her father; Lymphoma in her maternal grandmother; Melanoma in her maternal grandfather; Stroke in her paternal grandfather.    Social History     Tobacco Use    Smoking status: Never Smoker    Smokeless  "tobacco: Never Used   Substance Use Topics    Alcohol use: Yes     Alcohol/week: 1.0 standard drink     Types: 1 Glasses of wine per week     Comment: social    Drug use: Never       Review of Systems   Constitutional: Negative for chills and fever.   HENT: Negative for congestion, sinus pain and sore throat.    Eyes: Negative for blurred vision.   Respiratory: Negative for cough, shortness of breath and wheezing.    Cardiovascular: Negative for chest pain, palpitations and leg swelling.   Gastrointestinal: Positive for heartburn. Negative for abdominal pain, constipation, diarrhea, nausea and vomiting.   Genitourinary: Negative for dysuria.   Musculoskeletal: Negative for myalgias.   Skin: Negative for rash.   Neurological: Negative for dizziness, weakness and headaches.   Psychiatric/Behavioral: Negative for depression. The patient is not nervous/anxious.         Outpatient Encounter Medications as of 5/5/2022   Medication Sig Dispense Refill    pantoprazole (PROTONIX) 20 MG tablet Take 1 tablet (20 mg total) by mouth 2 (two) times daily before meals. for 14 days 28 tablet 0    [DISCONTINUED] norethindrone-e.estradioL-iron (JUNEL FE 24) 1 mg-20 mcg (24)/75 mg (4) per tablet Take 1 tablet by mouth once daily. 28 tablet 12    [DISCONTINUED] pantoprazole (PROTONIX) 20 MG tablet Take 1 tablet (20 mg total) by mouth 2 (two) times daily before meals. for 14 days 28 tablet 0     No facility-administered encounter medications on file as of 5/5/2022.        Review of patient's allergies indicates:   Allergen Reactions    Bactrim [sulfamethoxazole-trimethoprim] Hives     Other reaction(s): Hives    Penicillins      Other reaction(s): Hives    Sulfa (sulfonamide antibiotics)        Physical Exam      Vital Signs  Temp: 98.6 °F (37 °C)  Temp src: Oral  Pulse: 92  SpO2: 98 %  BP: 120/80  BP Location: Right arm  Patient Position: Sitting  Height and Weight  Height: 5' 2" (157.5 cm)  Weight: 108.6 kg (239 lb 6.7 oz)  BSA " (Calculated - sq m): 2.18 sq meters  BMI (Calculated): 43.8  Weight in (lb) to have BMI = 25: 136.4    Physical Exam  Vitals and nursing note reviewed.   Constitutional:       General: She is not in acute distress.     Appearance: Normal appearance. She is well-developed. She is obese. She is not ill-appearing.   HENT:      Head: Normocephalic and atraumatic.      Right Ear: External ear normal.      Left Ear: External ear normal.   Eyes:      Conjunctiva/sclera: Conjunctivae normal.      Pupils: Pupils are equal, round, and reactive to light.   Neck:      Thyroid: No thyromegaly.      Vascular: No JVD.      Trachea: No tracheal deviation.   Cardiovascular:      Rate and Rhythm: Normal rate and regular rhythm.      Heart sounds: Normal heart sounds. No murmur heard.  Pulmonary:      Effort: Pulmonary effort is normal.      Breath sounds: Normal breath sounds.   Chest:      Chest wall: No tenderness.   Abdominal:      General: Bowel sounds are normal.      Palpations: Abdomen is soft.      Tenderness: There is no abdominal tenderness. There is no guarding.   Musculoskeletal:         General: Normal range of motion.      Cervical back: Normal range of motion and neck supple.   Lymphadenopathy:      Cervical: No cervical adenopathy.   Skin:     General: Skin is warm and dry.   Neurological:      Mental Status: She is alert and oriented to person, place, and time.   Psychiatric:         Behavior: Behavior normal.         Thought Content: Thought content normal.         Judgment: Judgment normal.          Laboratory:  CBC:  Lab Results   Component Value Date    WBC 18.56 (H) 08/27/2021    RBC 4.49 08/27/2021    HGB 13.7 08/27/2021    HCT 41 08/27/2021     08/27/2021    MCV 88 08/27/2021    MCH 30.5 08/27/2021    MCHC 34.9 08/27/2021    MCHC 33.5 01/09/2014    MCHC 34.8 12/27/2012     CMP:  Lab Results   Component Value Date    GLU 97 12/16/2020    CALCIUM 9.0 12/16/2020    ALBUMIN 4.1 12/16/2020    PROT 8.1  12/16/2020     12/16/2020    K 4.1 12/16/2020    CO2 25 12/16/2020     12/16/2020    BUN 12 12/16/2020    ALKPHOS 99 12/16/2020    ALT 24 12/16/2020    AST 21 12/16/2020    BILITOT 0.7 12/16/2020    BILITOT 0.7 01/09/2014    BILITOT 0.2 08/17/2010     URINALYSIS:  Lab Results   Component Value Date    COLORU Yellow 08/27/2021    SPECGRAV 1.030 08/27/2021    PHUR 6.0 08/27/2021    PROTEINUA Negative 08/27/2021    BACTERIA Occasional 08/27/2021    NITRITE Negative 08/27/2021    LEUKOCYTESUR Negative 08/27/2021    UROBILINOGEN Negative 01/08/2014      LIPIDS:  Lab Results   Component Value Date    TSH 2.658 12/16/2020    TSH 1.884 01/09/2014    HDL 34 (L) 12/16/2020    HDL 39 (L) 01/09/2014    HDL 38 (L) 08/17/2010    CHOL 139 12/16/2020    CHOL 139 01/09/2014    CHOL 176 08/17/2010    TRIG 67 12/16/2020    TRIG 42 01/09/2014    TRIG 51 08/17/2010    LDLCALC 91.6 12/16/2020    LDLCALC 91.6 01/09/2014    LDLCALC 127.8 08/17/2010    CHOLHDL 24.5 12/16/2020    CHOLHDL 28.1 01/09/2014    CHOLHDL 21.6 08/17/2010    NONHDLCHOL 105 12/16/2020    NONHDLCHOL 100 01/09/2014    TOTALCHOLEST 4.1 12/16/2020    TOTALCHOLEST 3.6 01/09/2014    TOTALCHOLEST 4.6 08/17/2010     TSH:  Lab Results   Component Value Date    TSH 2.658 12/16/2020    TSH 1.884 01/09/2014     A1C:  Lab Results   Component Value Date    HGBA1C 4.9 12/16/2020    HGBA1C 5.1 01/09/2014         Assessment/Plan     Radha Kee is a 31 y.o.female with:    Encounter for medical examination to establish care  -     CBC Auto Differential; Future; Expected date: 05/05/2022  -     Comprehensive Metabolic Panel; Future; Expected date: 05/05/2022  -     Hemoglobin A1C; Future; Expected date: 05/05/2022  -     Lipid Panel; Future; Expected date: 05/05/2022  -     T4, Free; Future; Expected date: 05/05/2022  -     TSH; Future; Expected date: 05/05/2022  -     Hepatitis C Antibody; Future; Expected date: 05/05/2022  -     HIV 1/2 Ag/Ab (4th Gen); Future; Expected  date: 05/05/2022    Gastroesophageal reflux disease without esophagitis  -     CBC Auto Differential; Future; Expected date: 05/05/2022  -     Comprehensive Metabolic Panel; Future; Expected date: 05/05/2022  -     Hemoglobin A1C; Future; Expected date: 05/05/2022  -     Lipid Panel; Future; Expected date: 05/05/2022  -     T4, Free; Future; Expected date: 05/05/2022  -     TSH; Future; Expected date: 05/05/2022  -     Discontinue: pantoprazole (PROTONIX) 20 MG tablet; Take 1 tablet (20 mg total) by mouth 2 (two) times daily before meals. for 14 days  Dispense: 28 tablet; Refill: 0  -     pantoprazole (PROTONIX) 20 MG tablet; Take 1 tablet (20 mg total) by mouth 2 (two) times daily before meals. for 14 days  Dispense: 28 tablet; Refill: 0    Class 3 severe obesity due to excess calories without serious comorbidity with body mass index (BMI) of 40.0 to 44.9 in adult  -     CBC Auto Differential; Future; Expected date: 05/05/2022  -     Comprehensive Metabolic Panel; Future; Expected date: 05/05/2022  -     Hemoglobin A1C; Future; Expected date: 05/05/2022  -     Lipid Panel; Future; Expected date: 05/05/2022  -     T4, Free; Future; Expected date: 05/05/2022  -     TSH; Future; Expected date: 05/05/2022    Other chest pain  -     CBC Auto Differential; Future; Expected date: 05/05/2022  -     Comprehensive Metabolic Panel; Future; Expected date: 05/05/2022  -     Hemoglobin A1C; Future; Expected date: 05/05/2022  -     Lipid Panel; Future; Expected date: 05/05/2022  -     T4, Free; Future; Expected date: 05/05/2022  -     TSH; Future; Expected date: 05/05/2022  -     IN OFFICE EKG 12-LEAD (to [facility])    Screening for diabetes mellitus  -     CBC Auto Differential; Future; Expected date: 05/05/2022  -     Comprehensive Metabolic Panel; Future; Expected date: 05/05/2022  -     Hemoglobin A1C; Future; Expected date: 05/05/2022  -     Lipid Panel; Future; Expected date: 05/05/2022  -     T4, Free; Future; Expected date:  05/05/2022  -     TSH; Future; Expected date: 05/05/2022    Encounter for lipid screening for cardiovascular disease  -     CBC Auto Differential; Future; Expected date: 05/05/2022  -     Comprehensive Metabolic Panel; Future; Expected date: 05/05/2022  -     Hemoglobin A1C; Future; Expected date: 05/05/2022  -     Lipid Panel; Future; Expected date: 05/05/2022  -     T4, Free; Future; Expected date: 05/05/2022  -     TSH; Future; Expected date: 05/05/2022    Family history of liver disease  -     CBC Auto Differential; Future; Expected date: 05/05/2022  -     Comprehensive Metabolic Panel; Future; Expected date: 05/05/2022  -     Hemoglobin A1C; Future; Expected date: 05/05/2022  -     Lipid Panel; Future; Expected date: 05/05/2022  -     T4, Free; Future; Expected date: 05/05/2022  -     TSH; Future; Expected date: 05/05/2022    Encounter for hepatitis C screening test for low risk patient  -     Hepatitis C Antibody; Future; Expected date: 05/05/2022    Screening for HIV (human immunodeficiency virus)  -     HIV 1/2 Ag/Ab (4th Gen); Future; Expected date: 05/05/2022          I spent 40 minutes on the day of this encounter for preparing for, evaluating, treating, and managing this patient.      -Continue current medications and maintain follow up with specialists.  Return to clinic within 6 months with new PCP for continued medical management   No follow-ups on file.       ADOLFO Saucedo  [facility]

## 2022-08-03 ENCOUNTER — PATIENT MESSAGE (OUTPATIENT)
Dept: BARIATRICS | Facility: CLINIC | Age: 32
End: 2022-08-03
Payer: COMMERCIAL

## 2022-08-10 ENCOUNTER — TELEPHONE (OUTPATIENT)
Dept: PRIMARY CARE CLINIC | Facility: CLINIC | Age: 32
End: 2022-08-10
Payer: COMMERCIAL

## 2022-09-01 ENCOUNTER — TELEPHONE (OUTPATIENT)
Dept: PRIMARY CARE CLINIC | Facility: CLINIC | Age: 32
End: 2022-09-01

## 2022-09-02 ENCOUNTER — OFFICE VISIT (OUTPATIENT)
Dept: PRIMARY CARE CLINIC | Facility: CLINIC | Age: 32
End: 2022-09-02
Attending: INTERNAL MEDICINE
Payer: COMMERCIAL

## 2022-09-02 VITALS
OXYGEN SATURATION: 98 % | SYSTOLIC BLOOD PRESSURE: 122 MMHG | HEIGHT: 62 IN | BODY MASS INDEX: 44.34 KG/M2 | DIASTOLIC BLOOD PRESSURE: 88 MMHG | WEIGHT: 240.94 LBS

## 2022-09-02 DIAGNOSIS — E66.01 CLASS 3 SEVERE OBESITY DUE TO EXCESS CALORIES WITHOUT SERIOUS COMORBIDITY WITH BODY MASS INDEX (BMI) OF 40.0 TO 44.9 IN ADULT: Primary | ICD-10-CM

## 2022-09-02 DIAGNOSIS — K21.9 GASTROESOPHAGEAL REFLUX DISEASE WITHOUT ESOPHAGITIS: ICD-10-CM

## 2022-09-02 DIAGNOSIS — F41.1 GENERALIZED ANXIETY DISORDER: ICD-10-CM

## 2022-09-02 DIAGNOSIS — Z00.00 HEALTHCARE MAINTENANCE: ICD-10-CM

## 2022-09-02 DIAGNOSIS — G43.009 MIGRAINE WITHOUT AURA AND WITHOUT STATUS MIGRAINOSUS, NOT INTRACTABLE: ICD-10-CM

## 2022-09-02 DIAGNOSIS — Z87.42 HISTORY OF ABNORMAL CERVICAL PAP SMEAR: ICD-10-CM

## 2022-09-02 PROCEDURE — 3008F BODY MASS INDEX DOCD: CPT | Mod: CPTII,S$GLB,, | Performed by: INTERNAL MEDICINE

## 2022-09-02 PROCEDURE — 99999 PR PBB SHADOW E&M-EST. PATIENT-LVL III: CPT | Mod: PBBFAC,,, | Performed by: INTERNAL MEDICINE

## 2022-09-02 PROCEDURE — 99499 NO LOS: ICD-10-PCS | Mod: S$GLB,,, | Performed by: INTERNAL MEDICINE

## 2022-09-02 PROCEDURE — 99499 UNLISTED E&M SERVICE: CPT | Mod: S$GLB,,, | Performed by: INTERNAL MEDICINE

## 2022-09-02 PROCEDURE — 99999 PR PBB SHADOW E&M-EST. PATIENT-LVL III: ICD-10-PCS | Mod: PBBFAC,,, | Performed by: INTERNAL MEDICINE

## 2022-09-02 PROCEDURE — 3079F PR MOST RECENT DIASTOLIC BLOOD PRESSURE 80-89 MM HG: ICD-10-PCS | Mod: CPTII,S$GLB,, | Performed by: INTERNAL MEDICINE

## 2022-09-02 PROCEDURE — 1159F PR MEDICATION LIST DOCUMENTED IN MEDICAL RECORD: ICD-10-PCS | Mod: CPTII,S$GLB,, | Performed by: INTERNAL MEDICINE

## 2022-09-02 PROCEDURE — 3074F PR MOST RECENT SYSTOLIC BLOOD PRESSURE < 130 MM HG: ICD-10-PCS | Mod: CPTII,S$GLB,, | Performed by: INTERNAL MEDICINE

## 2022-09-02 PROCEDURE — 1160F RVW MEDS BY RX/DR IN RCRD: CPT | Mod: CPTII,S$GLB,, | Performed by: INTERNAL MEDICINE

## 2022-09-02 PROCEDURE — 1159F MED LIST DOCD IN RCRD: CPT | Mod: CPTII,S$GLB,, | Performed by: INTERNAL MEDICINE

## 2022-09-02 PROCEDURE — 1160F PR REVIEW ALL MEDS BY PRESCRIBER/CLIN PHARMACIST DOCUMENTED: ICD-10-PCS | Mod: CPTII,S$GLB,, | Performed by: INTERNAL MEDICINE

## 2022-09-02 PROCEDURE — 3008F PR BODY MASS INDEX (BMI) DOCUMENTED: ICD-10-PCS | Mod: CPTII,S$GLB,, | Performed by: INTERNAL MEDICINE

## 2022-09-02 PROCEDURE — 3074F SYST BP LT 130 MM HG: CPT | Mod: CPTII,S$GLB,, | Performed by: INTERNAL MEDICINE

## 2022-09-02 PROCEDURE — 3079F DIAST BP 80-89 MM HG: CPT | Mod: CPTII,S$GLB,, | Performed by: INTERNAL MEDICINE

## 2022-09-02 RX ORDER — SUMATRIPTAN 50 MG/1
50 TABLET, FILM COATED ORAL
Qty: 9 TABLET | Refills: 1 | Status: SHIPPED | OUTPATIENT
Start: 2022-09-02 | End: 2022-09-07

## 2022-09-02 RX ORDER — OMEPRAZOLE 40 MG/1
40 CAPSULE, DELAYED RELEASE ORAL DAILY
Qty: 30 CAPSULE | Refills: 11 | Status: SHIPPED | OUTPATIENT
Start: 2022-09-02 | End: 2024-01-16 | Stop reason: SDUPTHER

## 2022-09-02 NOTE — ASSESSMENT & PLAN NOTE
Omeprazole 40 mg daily x 6 weeks then stop  Hopefully, we will be able to prevent recurrence of symptoms with weight loss, facilitated by changes in diet and lifestyle

## 2022-09-02 NOTE — ASSESSMENT & PLAN NOTE
Refer to LCSW for help with emotional eating  Refer to dietitian for help nutritional support counseling  Start using bitesnap to keep photographic food log   We discussed a trial of phentermine/topiramate, but she seems anxious about starting medications at this time  Check TSH, CMP, and A1c

## 2022-09-02 NOTE — ASSESSMENT & PLAN NOTE
She reports what sounds like classic migraines, describing 2 episodes in the past month lasting at least 72 hours associated with photphobia/phonphobia and helped by lying down in a dark quiet room  --has not been on any type of preventive or abortive therapy  --has tried OTC NSAIDs, which generally help her get through her workday  Add Imitrex prn as abortive therapy prn migraine at earliest onset of symptoms

## 2022-09-02 NOTE — PATIENT INSTRUCTIONS
Keeping a food and activity log    Successfully losing significant weight and keeping it off is one of the hardest things that a person can do.  Difficulty losing weight is complex and influenced by multiple variables including genetic, environmental, social, cultural, metabolic, and behavioral factors.  Our goal will be to understand these factors as they relate to you and your health improvement goals as deeply and completely as possible.      To that end it will be very important for us to understand 2 of the most important variables that can impact your success in achieving any weight loss goals:  the number of calories you take in daily through food and drink versus the number of calories expended daily through activity.  It will be incredibly helpful for us to know exactly what you are eating/drinking, how much you are eating/drinking, and how often for each.  In this case both the types and the amounts of calories matter.       You could keep a written food log of this information where you write down everything you eat or drink, as well as how often and how much.  But we find that it may be even easier for you to use your phone to take a photo of everything you or drink.  There is an more known as Appography (available from the more store) that enables you take photos of everything you eat or drink, and it will automatically record the time of the photo.  Another more is My Fitness Pal, which can sync to your smart watch or other wearable device (and enable you to keep track of how active you have been).  Your health  or dietitian can work with you to help you learn how to use these apps to help provide as much information as possible for our team to help you achieve your health improvement goals.             [employer] more  My Fitness Pal more                            Also [employer]      Primary headaches consist of migraine, tension-type, and cluster.  Cluster headaches are very rare, occurring in only  1/100,000 people.  Migraine headaches are commonly misdiagnosed and therefore are often not treated appropriately.  Our goal will be to accurately diagnose the cause of your headache and then work together to find the treatment that will give you relief.      Primary Headache Treatments:  Abortive - for acute treatment of an attack (aims to abort the episode)  Preventive - for ongoing prevention of acute attacks or episodes (aims to prevent episodes)  Migraine:  Abortive - newer migraine-specific acute treatments have been developed that do not have some of the vascular risks associated with the triptans; these include the ditans and the gepants (see below)  Combination analgesics - aspirin or acetaminophen (Tylenol) with caffeine  NSAIDs (examples include naproxen (Aleve, Naprosyn), ibuprofen (Motrin, Aleve), flurbiprofen  Naproxen sodium (found in over-the counter Aleve) is one of the main stays  Ideal dose is about 550 mg (2 ½ tablets) - can repeat a second dose in one hour  Can take with a caffeine containing beverage to confer additional benefit (caffeine can be used for acute/abortive treatment, but should not be used chronically to prevent HA attacks); an average cup of coffee has almost 100 mg of caffeine  If nausea/vomiting are present, then a combination of metoclopramide (Reglan) + Naproxen can be considered  Dissolvable diclofenac potassium  Triptans - all have similar levels of effectiveness; should not be taken in people with known vascular disease (coronary artery disease, peripheral arterial disease)  Many oral triptans available  Imitrex (sumatriptan) as a subcutaneous injection is rapidly effective (especially in those with nausea or vomiting)  Zomig (zolmitriptan) as an intranasal preparation can also be more rapidly effective (especially in those with nausea or vomiting)  Ergots   Cafergot--a combination of ergotamine tartrate (1 mg) and caffeine (100 mg)--may be helpful; 1 to 2 tablets  taken at the onset of the headache and repeated every 30 minutes, up to 6 tablets per attack and no more than 10/month  Have been used for years, but have largely been replaced by triptans which have better safety and tolerability profiles  Dopamine receptor antagonists  Metoclopramide (Reglan) - orally or IV (10-20 mg)  Prochlorperazine (Compazine) - may be taken orally (5-10 mg), rectally as a suppository (25 mg), IM or IV (5-10 mg)  Ditans - Lasmiditan (Reyvow)  Safe in people with vascular disease or cardiovascular risk factors, unlike the triptans   50 to 200 mg taken once at headache onset with no more than one dose in 24 hours  Dizziness and sleepiness are common side effects -- you must not drive for at least 8 hours after taking this medication!  Gepants - direct blockers of the CGRP (calcitonin gene-related peptide) receptors  Ubrogepant (Ubrelvy) - 50 mg and 100 mg tablets orally at headache onset, and may repeat after 2 hours (max dose 200 mg in 24 hours)  Rimegepant (Nurtec) - 75mg orally disintegrating tablet taken at onset of headache, not to be repeated for at least 24 hours (max dose 75 mg in 24 hours)  Butalbital-containing combination oral analgesics - high risk of overuse and dependence and should be used only as a last resort  Opioid analgesics - should be avoided  Preventive - similar levels of effectiveness have been demonstrated among those studied  If HA's are impacting quality of life, then you may choose to opt for a preventive strategy  In general, if you are experiencing more than 2 to 3 headaches per month, then you may benefit from preventive treatment  Essentially the question you need to ask yourself, is are you willing to take a medication every day in order to reduce the frequency of your headaches by half?  If yes, then you are a good candidate for preventive treatment  If no, then you will continue to use abortive treatment as needed  Beta-blockers - propranolol (Inderal),  timolol, metoprolol, atenolol à cheap, generally well tolerated  1/100 people may experience fatigue  You may experience colorful or vivid dreams  If you have asthma, then this medication could make it worse  Tricyclic antidepressants (TCAs) - an older class of medications that are no longer used for depression  Side effects include weight gain and sleepiness  Work well for both migraine and tension-type headaches (TTH)  Work well for neuropathic pain, but we also have newer better tolerated options  SNRIs (duloxetine [Cymbalta], venlafaxine [Effexor])  Don't cause nearly the sleepiness or weight gain of the TCAs  But they cannot be stopped suddenly and need to be tapered  NSAIDs - no sleepiness, no weight gain, generally well tolerated, but can irritate the stomach to the point of causing gastritis or ulcers  Probably better reserved for abortive therapy  Very effective for menstrual migraines and can be taken for several days around that time of the month  Anti-epileptics  Valproic acid (Depakote) - can cause weight gain, hair loss, abnormal liver enzymes, pancreatitis, and is a class X teratogen that is contraindicated in pregnancy à probably should be avoided in women of child-bearing age  Topiramate (Topamax) - impairs cognition, and people whose job requires thinking for a living may experience difficulties at work (or in school), but it does help facilitate weight loss  Candesartan  Supplements  Butterbur - reports in [place] of liver toxicity and liver cancer - although some studies have demonstrated effectiveness, I would not recommend it because of safety issues  Supplements in the [place] are unregulated, meaning that safety is NOT studied, and manufacturers of supplements do not need to prove the effectiveness of their claims  One study did show that a combination of simvastatin 40mg + vitamin D 1000 units taken daily was effective at preventing migraines  When using a vitamin D supplement, use only brands  that have the [place] pharmacopoeia label on the bottle  No evidence supports use of calcium channel blockers (such as verapamil, which has been used in the past), SSRIs, or Botox in episodic migraine prevention; however, there is evidence for Botox as well as topiramate in chronic migraine prevention  Monoclonal antibodies against the calcitonin gene related receptors have been shown to be effective in the prevention of both episodic and chronic migraine  Erenumab (Aimovig) - self administered as a once monthly under the skin injection in upper arm, thigh, or abdomen (comes in 2 dosages, 70mg and 140mg)  Fremanezumab (Ajovy) - 225 mg injected under the skin monthly or 675 mg every 3 months  Galcanezumab (Emgality) - 120 mg injected under the skin  Eptinezumab (Vyepti) - 100 mg/mL IV infusion administered over 30-minutes every 3 months  Gepants  Rimepepant (Nurtec) can be taken every other day to prevent migraines  Tension Type:  Abortive   Acetaminophen (Tylenol)  Aspirin   Caffeine-containing compounds (e.g., Cafergot)  NSAIDs  Preventive  TCAs (amitriptyline [Elavil], nortriptyline [Pamelor])  SNRIs (duloxetine [Cymbalta], venlafaxine [Effexor]); also mirtazapine (Remeron)  Some experts have used the muscle relaxant tizanidine (Zanaflex) in the past, but current evidence does not support the use of muscle relaxants, benzodiazepines, opioids, or Botox in the management of tension-type headaches  NSAIDs - not really a good choice because of risk of stomach irritation and probably should be reserved for abortive therapy; also, not a good choice in older patients with high blood pressure, chronic kidney disease, or other medical problems  Medication Overuse Headaches  Simple analgesics--or medications that relieve pain--include aspirin, acetaminophen (Tylenol), ibuprofen (Advil or Motrin), naproxen (Aleve)  To prevent medication overuse, use of simple analgesics should be limited to 15 days or less, and combination  analgesics should be limited to no more than 10 days per month  >10x/month of certain medications (triptans, ergots, opioids, or combination analgesics such as those containing combinations of aspirin, acetaminophen, caffeine, and butalbital)  >15x/month of simple analgesics (ibuprofen or acetaminophen alone) à taking analgesics that often can also be associated with long term kidney damage (called analgesic nephropathy)  Withdrawal effects of these medications can manifest in 2 ways:  Stopping medication can cause headaches to worsen  Anxiety, where taking another dose takes the edge off  Not likely to occur with acetaminophen (Tylenol) or ibuprofen (Advil, Motrin)  Very common with ergots, butalbital, opioids, and benzodiazepines à these medications should rarely or never be used to treat or prevent headaches  Triptans can also be associated with medication overuse headaches and frequent use should be avoided  Do not take >10/month  Ideally no more than 2/week, but okay to take more per week as long as you average <10/month (example, 3 or 4 times in one week isn't bad as long as you aren't taking >10/month)  Follow-up  Typically, we will want to follow-up with you one month after starting a preventive treatment, and adjust the dosage if we have not seen the desired response (i.e., 50% reduction in HA frequency) within 4 to 6 weeks  We would like to give each medicine trial at least a month at a therapeutic dose before call it a failure  Thereafter we will follow-up again in 2 to 3 months and re-evaluate the treatment plan every 6 to 12 months  If you really hate being on a daily medicine, then let's at least give it 6 months before we start thinking about getting you off them completely  It is not uncommon to go through 2 or 3 different drugs before we find one that works for you  We can use the HIT-6 Headache Impact Test to measure your ability to function on the job, at school, at home, and in social  situations.  The HIT-6 survey will give us an objective score to tell us how well we are doing with our treatments and their impact on your quality of life    WARNING  Migraine with aura -- discontinuation of estrogen containing oral contraceptives is advised (due to increased risk of stroke).      Questions we will ask you:  Headache history  At what age did you develop your first memorable headache?  At what age did you develop your current headache pattern?  Is there a family history of headaches?  Associated symptoms:  Nausea or vomiting?  Light or sound sensitivity?  Visual or neurological symptoms?  In an average month  Total headache days?  Total severe headache days?  Total treated headache days?  How many different types of headaches do you have?  What treatments have you tried?  What has worked?  What hasn't?   How often per week are you taking the treatment?  Per month?  Are you willing to take a daily medication to cut your headache frequency in half?  Have you ever considered keeping a headache diary to identify possible triggers?

## 2022-09-02 NOTE — PROGRESS NOTES
"CHIEF COMPLAINT     Chief Complaint   Patient presents with    [facility] [place] [facility]       HPI     Radha Kee is a 32 y.o. female who presents for initial visit.  Her biggest concern today is her weight.  She reports gradual weight gain over the past 6 to 8 years.  DM is prevalent on her mother's side with her maternal grandfather and her aunts afflicted by DM.  Her mother does not have DM, but has managed to avoid it by maintaining her weight.  Her father's side has a history of BETANCOURT.  Her father was diagnosed with BETANCOURT at age 30.     She weighed about 155 lbs when she graduated from high school.  She admits to a history of emotional eating.  Recently she saw a dietitian, which she reports was "okay" and she also has been seen by the bariatric clinic.  But she reports that the clinic was very focused on calorie counting, and she became very fixated on calorie counting, which didn't work well for her.     She is not currently exercising and finds herself lacking motivation to do so.     Recently she has been experiencing some GERD symptoms.  She reports a burning sensation that radiates up into the back of her throat.  Not aggravated by specific food or drink, but notices worsening of symptoms at night and is worsened by lying supine.  She was given a trial of a PPI (protonix) several weeks ago, but reports that she wasn't taking it consistently.  Then she got some Prilosec OTC and was a little more faithful with that, completing a 14-day course.  It did help. Overall symptoms have gotten better, but still has some occasional mild symptoms.     History of migraine headaches (see problem list and A/P)    She does report a history of anxiety.  A few years ago -- when she became  -- she was prescribed a medication to treat anxiety, but only took 2 doses because she became anxious about side effects. She reports the anxiety began after her dad  in .  She has been to a therapist before and did find it " helpful, although she is not seeing one currently.    She had a history of an abnormal PAP smear -- HPV positive -- which happened after her  cheated on her, and ultimately ended in divorce.     Health goals:  1)  Lose weight  2)  Be less anxious  3)  Avoid getting BETANCOURT and DM      Atherosclerotic Cardiovascular Disease (ASCVD) Risk Score  The ASCVD Risk score (Kortney ALMARAZ, et al., 2019) failed to calculate for the following reasons:    The 2019 ASCVD risk score is only valid for ages 40 to 79    Past Medical History:  Past Medical History:   Diagnosis Date    Abnormal Pap smear of cervix 2019    + HR HPV    Allergy     History of acne     Migraine headache     without aura       Past Surgical History:  Past Surgical History:   Procedure Laterality Date    BUNIONECTOMY  2005    bilateral        Allergies:  Review of patient's allergies indicates:   Allergen Reactions    Bactrim [sulfamethoxazole-trimethoprim] Hives     Other reaction(s): Hives    Penicillins      Other reaction(s): Hives    Sulfa (sulfonamide antibiotics)        Family History:  Family History   Problem Relation Age of Onset    Hypertension Mother     Liver disease Father     Cataracts Father     Amenorrhea Sister     Acne Brother     Lymphoma Maternal Grandmother     Diabetes Maternal Grandfather     Cancer Maternal Grandfather         skin    Hypertension Maternal Grandfather     Melanoma Maternal Grandfather     Stroke Paternal Grandfather     Heart attack Paternal Grandfather 50    Diabetes Maternal Aunt     Hypertension Maternal Aunt     Diabetes Maternal Aunt     Hypertension Maternal Aunt     Diabetes Maternal Uncle     Hypertension Maternal Uncle     Breast cancer Other 60        paternal great aunt    Amblyopia Neg Hx     Blindness Neg Hx     Glaucoma Neg Hx     Macular degeneration Neg Hx     Retinal detachment Neg Hx     Strabismus Neg Hx     Thyroid disease Neg Hx     Colon cancer Neg Hx     Ovarian cancer Neg Hx     Psoriasis Neg  Hx     Lupus Neg Hx     Eczema Neg Hx        Social History:  Social History     Tobacco Use    Smoking status: Never    Smokeless tobacco: Never   Substance Use Topics    Alcohol use: Yes     Alcohol/week: 1.0 standard drink     Types: 1 Glasses of wine per week     Comment: social    Drug use: Never       Review of Systems:  Review of Systems   Constitutional:  Negative for malaise/fatigue.   HENT:  Negative for hearing loss.    Eyes:  Negative for blurred vision.        Wears glasses for distance   Respiratory:  Negative for cough and shortness of breath.    Cardiovascular:  Negative for chest pain and palpitations.   Gastrointestinal:  Positive for heartburn. Negative for abdominal pain, blood in stool, constipation, nausea and vomiting.   Genitourinary:  Negative for dysuria and hematuria.   Musculoskeletal:  Negative for joint pain and myalgias.   Skin:  Negative for rash.   Neurological:  Positive for headaches.   Psychiatric/Behavioral:  The patient is nervous/anxious. The patient does not have insomnia.      Health Maintenance  Health Maintenance         Date Due Completion Date    Hepatitis C Screening Never done ---    HIV Screening Never done ---    TETANUS VACCINE Never done ---    COVID-19 Vaccine (3 - Booster for Pfizer series) 01/14/2022 8/14/2021    Influenza Vaccine (1) 09/01/2022 1/31/2022    Cervical Cancer Screening 08/04/2026 8/4/2021            Mental Health Screening  PHQ-9  Depression Patient Health Questionnaire (PHQ-9)  Over the last two weeks how often have you been bothered by little interest or pleasure in doing things: Not at all  Over the last two weeks how often have you been bothered by feeling down, depressed or hopeless: Not at all    MITESH-7  Generalized Anxiety Disorder 7-item (MITESH-7) Scale. HOW OFTEN DURING THE PAST 2 WEEKS HAVE YOU FELT BOTHERED BY:  1. Feeling nervous, anxious, or on edge?: More than half the days  2. Not being able to stop or control worrying?: Several  "days  3. Worrying too much about different things?: Several days  4. Trouble relaxing?: More than half the days  5. Being so restless that it is hard to sit still?: Nearly everyday  6. Becoming easily annoyed or irritable?: Several days  7. Feeling afraid as if something awful might happen?: Several days  8. If you checked off any problems, how difficult have these problems made it for you to do your work, take care of things at home, or get along with other people?: Somewhat difficult  MITESH-7 Score: 11  Number answered (out of first 7): 7  Interpretation: Moderate Anxiety    PHYSICAL EXAM     /88 (BP Location: Right arm, Patient Position: Sitting, BP Method: Large (Manual))   Ht 5' 2" (1.575 m)   Wt 109.3 kg (240 lb 15.4 oz)   SpO2 98%   BMI 44.07 kg/m²   General:  Well-developed, well-nourished, no acute distress  Head:  Normocephalic, atraumatic  Eyes:  PERRL, conjunctiva pink without hemorrhages or petechiae, anicteric sclera, no episcleritis  Nose:  Normal turbinates, pink mucosa without purulent nasal discharge  Mouth:  Moist mucous membranes, no pharyngeal exudates or erythema  Neck:: Supple, normal ROM; No carotid bruit with normal carotid upstroke; No JVD  Chest:  clear to auscultation bilaterally, no respiratory distress  Heart:  Regular rate and rhythm without murmur, rub, or gallop  Abdomen:  Soft, nontender, no distension, normoactive bowel sounds, no abdominal bruit  Extremities:  No clubbing, cyanosis, or edema  Neuro:  Alert and oriented, no focal deficits; knee jerks 2+ bilaterally  Skin: No rash      LABS         ASSESSMENT/PLAN     Radha Kee is a 32 y.o. female with    Migraine without aura and without status migrainosus, not intractable  She reports what sounds like classic migraines, describing 2 episodes in the past month lasting at least 72 hours associated with photphobia/phonphobia and helped by lying down in a dark quiet room  --has not been on any type of preventive or " abortive therapy  --has tried OTC NSAIDs, which generally help her get through her workday  Add Imitrex prn as abortive therapy prn migraine at earliest onset of symptoms      History of abnormal cervical Pap smear  HPV positive   Her last PAP smear was last year, and she now gets them annually    Generalized anxiety disorder  Began after the death of her dad from BETANCOURT that progressed to cirrhosis (requiring liver transplantation)  MITESH score today 11   [facility]      Healthcare maintenance  Due for next PAP smear this year  Check HIV and Hep C    Class 3 severe obesity due to excess calories without serious comorbidity with body mass index (BMI) of 40.0 to 44.9 in adult  [facility] to [facility] for help with emotional eating  Refer to dietitian for help nutritional support counseling  Start using bitesnap to keep photographic food log   We discussed a trial of phentermine/topiramate, but she seems anxious about starting medications at this time      Gastroesophageal reflux disease without esophagitis  Omeprazole 40 mg daily x 6 weeks then stop  Hopefully, we will be able to prevent recurrence of symptoms with weight loss, facilitated by changes in diet and lifestyle      Follow up in about 6 weeks (around 10/14/2022).

## 2022-09-02 NOTE — ASSESSMENT & PLAN NOTE
Began after the death of her dad from BETANCOURT that progressed to cirrhosis (requiring liver transplantation)  MITESH score today 11   Refer to [facility], for CBT

## 2022-09-05 ENCOUNTER — PATIENT MESSAGE (OUTPATIENT)
Dept: PRIMARY CARE CLINIC | Facility: CLINIC | Age: 32
End: 2022-09-05

## 2022-09-06 ENCOUNTER — LAB VISIT (OUTPATIENT)
Dept: LAB | Facility: HOSPITAL | Age: 32
End: 2022-09-06
Attending: INTERNAL MEDICINE
Payer: COMMERCIAL

## 2022-09-06 ENCOUNTER — PATIENT MESSAGE (OUTPATIENT)
Dept: PRIMARY CARE CLINIC | Facility: CLINIC | Age: 32
End: 2022-09-06

## 2022-09-06 DIAGNOSIS — E66.01 CLASS 3 SEVERE OBESITY DUE TO EXCESS CALORIES WITHOUT SERIOUS COMORBIDITY WITH BODY MASS INDEX (BMI) OF 40.0 TO 44.9 IN ADULT: ICD-10-CM

## 2022-09-06 DIAGNOSIS — Z00.00 HEALTHCARE MAINTENANCE: ICD-10-CM

## 2022-09-06 LAB
ALBUMIN SERPL BCP-MCNC: 4 G/DL (ref 3.5–5.2)
ALP SERPL-CCNC: 85 U/L (ref 55–135)
ALT SERPL W/O P-5'-P-CCNC: 23 U/L (ref 10–44)
ANION GAP SERPL CALC-SCNC: 8 MMOL/L (ref 8–16)
AST SERPL-CCNC: 16 U/L (ref 10–40)
BILIRUB SERPL-MCNC: 0.7 MG/DL (ref 0.1–1)
BUN SERPL-MCNC: 8 MG/DL (ref 6–20)
CALCIUM SERPL-MCNC: 9.3 MG/DL (ref 8.7–10.5)
CHLORIDE SERPL-SCNC: 105 MMOL/L (ref 95–110)
CHOLEST SERPL-MCNC: 154 MG/DL (ref 120–199)
CHOLEST/HDLC SERPL: 3.9 {RATIO} (ref 2–5)
CO2 SERPL-SCNC: 23 MMOL/L (ref 23–29)
CREAT SERPL-MCNC: 0.8 MG/DL (ref 0.5–1.4)
EST. GFR  (NO RACE VARIABLE): >60 ML/MIN/1.73 M^2
ESTIMATED AVG GLUCOSE: 100 MG/DL (ref 68–131)
GLUCOSE SERPL-MCNC: 96 MG/DL (ref 70–110)
HBA1C MFR BLD: 5.1 % (ref 4–5.6)
HCV AB SERPL QL IA: NORMAL
HDLC SERPL-MCNC: 40 MG/DL (ref 40–75)
HDLC SERPL: 26 % (ref 20–50)
HIV 1+2 AB+HIV1 P24 AG SERPL QL IA: NORMAL
LDLC SERPL CALC-MCNC: 105 MG/DL (ref 63–159)
NONHDLC SERPL-MCNC: 114 MG/DL
POTASSIUM SERPL-SCNC: 3.9 MMOL/L (ref 3.5–5.1)
PROT SERPL-MCNC: 8.1 G/DL (ref 6–8.4)
SODIUM SERPL-SCNC: 136 MMOL/L (ref 136–145)
TRIGL SERPL-MCNC: 45 MG/DL (ref 30–150)
TSH SERPL DL<=0.005 MIU/L-ACNC: 1.41 UIU/ML (ref 0.4–4)

## 2022-09-06 PROCEDURE — 87389 HIV-1 AG W/HIV-1&-2 AB AG IA: CPT | Performed by: INTERNAL MEDICINE

## 2022-09-06 PROCEDURE — 80061 LIPID PANEL: CPT | Performed by: INTERNAL MEDICINE

## 2022-09-06 PROCEDURE — 80053 COMPREHEN METABOLIC PANEL: CPT | Performed by: INTERNAL MEDICINE

## 2022-09-06 PROCEDURE — 84443 ASSAY THYROID STIM HORMONE: CPT | Performed by: INTERNAL MEDICINE

## 2022-09-06 PROCEDURE — 36415 COLL VENOUS BLD VENIPUNCTURE: CPT | Performed by: INTERNAL MEDICINE

## 2022-09-06 PROCEDURE — 86803 HEPATITIS C AB TEST: CPT | Performed by: INTERNAL MEDICINE

## 2022-09-06 PROCEDURE — 83036 HEMOGLOBIN GLYCOSYLATED A1C: CPT | Performed by: INTERNAL MEDICINE

## 2022-09-07 ENCOUNTER — DOCUMENTATION ONLY (OUTPATIENT)
Dept: PRIMARY CARE CLINIC | Facility: CLINIC | Age: 32
End: 2022-09-07

## 2022-09-07 DIAGNOSIS — G43.009 MIGRAINE WITHOUT AURA AND WITHOUT STATUS MIGRAINOSUS, NOT INTRACTABLE: ICD-10-CM

## 2022-09-07 RX ORDER — ERGOTAMINE TARTRATE AND CAFFEINE 1; 100 MG/1; MG/1
2 TABLET, FILM COATED ORAL ONCE
Qty: 2 TABLET | Refills: 0 | Status: SHIPPED | OUTPATIENT
Start: 2022-09-07 | End: 2023-12-08

## 2022-09-07 NOTE — PROGRESS NOTES
"[facility] BEHAVIORAL HEALTH INTAKE    DATE:  9/7/2022  REFERRAL SOURCE:  Sandip Bear MD  TYPE OF VISIT:  In person  LENGTH OF SESSION: 30  .  HISTORY OF PRESENTING ILLNESS:  Radha Kee, a 32 y.o. female with history of morbid obesity.    CHIEF COMPLAINT/REASON FOR ENCOUNTER: Pt presented for initial assessment. Met with patient. Pt's chief complaint includes the following: behavior.    Patient does not currently have a psychiatrist.    Patient does not currently have a therapist.     No med's. They are not interested in medication changes.    Current symptoms:  Depression: dysphoric mood, anhedonia, and hopelessness.  Anxiety: restlessness and panic attacks.  Insomnia:  N/A .  Melissa:  denies.  Psychosis: denies .      Session Content/Presenting Problem Hx:  Pt states they "know they need to lose weight" but feel overwhelmed given how busy they are. Pt and SW discussed importance of creating plan that is patient centered and consistent using CBT as a tool for increased self-awareness.  Current social stressors:   Work stress    Risk assessment:  Patient reports no suicidal ideation  Patient reports no homicidal ideation  Patient reports no self-injurious behavior  Patient reports no violent behavior    PSYCHIATRIC HISTORY:  History of Melissa or diagnosis of Bipolar Disorder in the past:  No  History of Psychosis or diagnosis of Schizophrenia in the past:  No  Previous Psychiatric Hospitalizations:  No  Previous SI/HI:   No  Previous Suicide Attempts:  No  Previous Medication Trials: No   Previous Psychiatric Outpatient Treatment: yes, 4-5 years ago for anxiety  History of Trauma:  No  History of Violence:  No  Access to a Gun:  No    SUBSTANCE ABUSE HISTORY:  Tobacco:  No   Alcohol: infrequent  Illicit Substances: No  Misuse of Prescription Medications:  No    MEDICAL HISTORY:  Past Medical History:   Diagnosis Date    Abnormal Pap smear of cervix 2019    + HR HPV    Allergy     History of acne     Migraine " headache     without aura       NEUROLOGIC HISTORY:  Seizures:  No  Head trauma:  No  Memory loss:  No    SOCIAL HISTORY (MARRIAGE, EMPLOYMENT, etc.):  Living Situation: single  Family Life Cycle:   Family: dad ( 8 years ago), mother, brother (lives, nearby)   Nuclear/Marriage:   Extended Family:   Supports: mother, brother, 2 friends (from nursing school; lives in [place]; other friend local)  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  Rastafarian/Spirituality: Conservative Oriental orthodox family ( [employer] [place])  Hobbies and Interests: dogs; spend time with family; live music festival    PSYCHIATRIC FAMILY HISTORY: not known      MENTAL HEALTH STATUS EXAM  General Appearance:  unremarkable, age appropriate   Speech: normal tone, normal rate, normal pitch, normal volume      Level of Cooperation: cooperative      Thought Processes: normal and logical   Mood: steady      Thought Content: normal, no suicidality, no homicidality, delusions, or paranoia   Affect: congruent and appropriate   Orientation: Oriented x3   Memory: recent >  intact, remote >  intact   Attention Span & Concentration: intact   Fund of General Knowledge: intact and appropriate to age and level of education   Abstract Reasoning: interpretation of similarities was abstract   Judgment & Insight: good     Language  intact       IMPRESSION:   My diagnostic impression is morbid obesity, as evidenced by BMI.     PROVISIONAL DIAGNOSES:  No diagnosis found.     STRENGTHS AND LIABILITIES: Strength: Patient accepts guidance/feedback, Strength: Patient is intelligent., Liability: Patient is impulsive., Liability: Patient lacks coping skills.    TREATMENT GOALS:  weight loss    PLAN: In this session a psych evaluation was conducted to get history and process pt's life. CBT and Motivational Interviewing will be utilized in future individual  therapy sessions to increase support and behavior modification.     RETURN TO CLINIC: No  follow-ups on file.

## 2022-09-07 NOTE — ASSESSMENT & PLAN NOTE
Patient has a history of hives in response to sulfa.  Imitrex and all triptans have sulfa moiety, therefore will try cafergot instead.    Risk of reaction to sulfa is low, but will not take any chances at this point.

## 2022-09-20 ENCOUNTER — NUTRITION (OUTPATIENT)
Dept: NUTRITION | Facility: CLINIC | Age: 32
End: 2022-09-20
Payer: COMMERCIAL

## 2022-09-20 DIAGNOSIS — K21.9 GASTROESOPHAGEAL REFLUX DISEASE WITHOUT ESOPHAGITIS: Primary | ICD-10-CM

## 2022-09-20 DIAGNOSIS — E66.01 CLASS 3 SEVERE OBESITY DUE TO EXCESS CALORIES WITHOUT SERIOUS COMORBIDITY WITH BODY MASS INDEX (BMI) OF 40.0 TO 44.9 IN ADULT: ICD-10-CM

## 2022-09-20 PROCEDURE — 97802 MEDICAL NUTRITION INDIV IN: CPT | Mod: S$GLB,,, | Performed by: DIETITIAN, REGISTERED

## 2022-09-20 PROCEDURE — 97802 PR MED NUTR THER, 1ST, INDIV, EA 15 MIN: ICD-10-PCS | Mod: S$GLB,,, | Performed by: DIETITIAN, REGISTERED

## 2022-09-20 NOTE — PROGRESS NOTES
Nutrition Assessment  Session Time:  90 minutes      Client name:  Radha Kee  :  1990  Age:  32 y.o.  Gender:  female    Client states she is here for her first nutrition counseling visit with me as referred to me by Dr. Bear for weight management. Ms. 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 When not at work, she spends a large portion of her hours assisting her family needs and as a result will sleep very few hours (goes to bed by midnight/1am and wakes up at 5:45am daily). Her lifestyle does not allow much time for herself and thus she relies on convenience foods (prepared fast meals) for all of her nutrition needs. She initially wanted to see Dr. Bear to treat her GERD, and is honest about not being initially interested in weight loss. She has a significant PMH of MITESH, chronic migraines, GERD, obesity BMI >40. She shares she feels GERD symptoms usually around 4pm or 11pm (2-3 hours after eating). She is not ready to add more time commitments to her daily/weekly schedule.    Anthropometrics  Height:  62 inches     Weight:  240.2 pounds  BMI:  43.9  % Body Fat:  N/A  Waist circumference: 45 inches    Clinical Signs/Symptoms  N/V/D:  none  Appetite:  good       Past Medical History:   Diagnosis Date    Abnormal Pap smear of cervix 2019    + HR HPV    Allergy     History of acne     Migraine headache     without aura       Past Surgical History:   Procedure Laterality Date    BUNIONECTOMY      bilateral        Medications    has a current medication list which includes the following prescription(s): ergotamine-caffeine 1-100 mg and omeprazole.    Vitamins, Minerals, and/or Supplements:  none. Vit D inconsistently     Food/Medication Interactions:  Reviewed     Food Allergies or Intolerances:  NKFA     Social History    Marital status:   Single  Employment:  [employer]    Social History     Tobacco Use    Smoking status: Never    Smokeless tobacco: Never   Substance Use Topics    Alcohol use: Yes     Alcohol/week: 1.0 standard drink     Types: 1 Glasses of wine per week     Comment: social        Lab Reports   Sodium   Date Value Ref Range Status   09/06/2022 136 136 - 145 mmol/L Final     Potassium   Date Value Ref Range Status   09/06/2022 3.9 3.5 - 5.1 mmol/L Final     Chloride   Date Value Ref Range Status   09/06/2022 105 95 - 110 mmol/L Final     CO2   Date Value Ref Range Status   09/06/2022 23 23 - 29 mmol/L Final     Glucose   Date Value Ref Range Status   09/06/2022 96 70 - 110 mg/dL Final     BUN   Date Value Ref Range Status   09/06/2022 8 6 - 20 mg/dL Final     Creatinine   Date Value Ref Range Status   09/06/2022 0.8 0.5 - 1.4 mg/dL Final     Calcium   Date Value Ref Range Status   09/06/2022 9.3 8.7 - 10.5 mg/dL Final     Total Protein   Date Value Ref Range Status   09/06/2022 8.1 6.0 - 8.4 g/dL Final     Albumin   Date Value Ref Range Status   09/06/2022 4.0 3.5 - 5.2 g/dL Final     Total Bilirubin   Date Value Ref Range Status   09/06/2022 0.7 0.1 - 1.0 mg/dL Final     Comment:     For infants and newborns, interpretation of results should be based  on gestational age, weight and in agreement with clinical  observations.    Premature Infant recommended reference ranges:  Up to 24 hours.............<8.0 mg/dL  Up to 48 hours............<12.0 mg/dL  3-5 days..................<15.0 mg/dL  6-29 days.................<15.0 mg/dL       Alkaline Phosphatase   Date Value Ref Range Status   09/06/2022 85 55 - 135 U/L Final     AST   Date Value Ref Range Status   09/06/2022 16 10 - 40 U/L Final     ALT   Date Value Ref Range Status   09/06/2022 23 10 - 44 U/L Final     Anion Gap   Date Value Ref Range Status   09/06/2022 8 8 - 16 mmol/L Final     eGFR if    Date Value Ref Range Status   12/16/2020 >60.0 >60 mL/min/1.73 m^2 Final      eGFR if non    Date Value Ref Range Status   12/16/2020 >60.0 >60 mL/min/1.73 m^2 Final     Comment:     Calculation used to obtain the estimated glomerular filtration  rate (eGFR) is the CKD-EPI equation.         Lab Results   Component Value Date    WBC 18.56 (H) 08/27/2021    HGB 13.7 08/27/2021    HCT 41 08/27/2021    MCV 88 08/27/2021     08/27/2021        Lab Results   Component Value Date    CHOL 154 09/06/2022     Lab Results   Component Value Date    HDL 40 09/06/2022     Lab Results   Component Value Date    LDLCALC 105.0 09/06/2022     Lab Results   Component Value Date    TRIG 45 09/06/2022     Lab Results   Component Value Date    CHOLHDL 26.0 09/06/2022     Lab Results   Component Value Date    HGBA1C 5.1 09/06/2022     BP Readings from Last 1 Encounters:   09/02/22 122/88       Food History  Breakfast:  none / latte from [employer] (blonde vanilla latte)  Mid-morning Snack:  none  Lunch:  130/2pm ([employer]) nachos / salads / burger and fries / + Coke (20oz)  Mid-afternoon Snack:  none / bag of chips  Dinner:  8/9pm Fast Food (Chick [place] [employer] donovan salad w one ranch and one avocado ranch/ [employer] cheesburger, fries and coke / [employer] cheesy gordita crunch and chicken quesadilla with Pepsi) / [place] salad w fried chicken and ranch, sometimes spinach dip with chips, salsa and sour cream + water/ sweet tea  Snack:  frozen fruit   *Fluid intake:  water, coffee, coke, pepsi, unsweet tea, 1 glass alcohol /month (champagne)    Exercise History:  walking 5 minutes.     Cultural/Spiritual/Personal Preferences:  None identified    Support System:  friends    State of Change:  Preparation    Barriers to Change:  none    Diagnosis    Overweight/ Obesity related to excessive energy intake and physical inactivity as evidenced by BMI 43.9, waist circumference 45 inches.    Intervention    RMR (Method:  [place]. Jeor):  1766 kcal  Activity Factor:   1.2     CAT:  2100 kcal - 500  (wt loss) = 1600 kcal    Goals:  1.  Reduce intake of liquid calories /caloric beverages (Coke/Pepsi/Starbucks Latte) to half of current intake (current intake around ~16 drinks/wk)  2.  Start walking the dogs on the weekends every week.    Nutrition Education  The following education was provided to the patient:  Discussed weight management.  Discussed physical activity guidelines and its associated benefits.  Discussed nutrition-related lab values and dietary and/or lifestyle factors affecting them.  Discussed importance of small behavior/habit changes in improving long-term adherence and sustainability.  Discussed goal setting.  Patient verbalized understanding of nutrition education and recommendations received.    Handouts Provided  None at this visit    Monitoring/Evaluation    Monitor the following:  Weight  BMI  % Body Fat  Caloric intake  Labs:  Vit D    Follow Up Plan:  Will communicate with referring healthcare provider for monitoring and follow-up.  Next Appointment: In four weeks.

## 2022-09-21 ENCOUNTER — DOCUMENTATION ONLY (OUTPATIENT)
Dept: PRIMARY CARE CLINIC | Facility: CLINIC | Age: 32
End: 2022-09-21

## 2022-09-21 NOTE — PROGRESS NOTES
Individual Psychotherapy (LCSW/PhD)  Radha Pickettmagen,  9/21/2022    Site:  [facility]         Therapeutic Intervention: Met with patient for individual psychotherapy.    Chief complaint/reason for encounter: anxiety     Interval history and content of current session: Pt reports stress from supporting aunt and grandmother. Pt arranges sitters from grandmother and aunt (developmental disabilities, behavior issues, anxiety/depression). Pt recently had to call 911 due to aunt's suicide attempt by ingestion. Pt was able to enroll grandmother in residential care.     Pt has begun going to get a massage for self-care. Pt's goal is to increase self-care and reduce stress levels. Pt would like to incorporate yoga and mindfulness practice at home to reduce stress and increase coping skills. SW provided pt with resources for guided meditation, yoga, mindfulness. Goal is for pt to choose a program and incorporate 2-3 times per week. Pt has met with dietitijohn Alexis and has set diet and activity goals,    Treatment plan:  Target symptoms: anxiety   Why chosen therapy is appropriate versus another modality: relevant to diagnosis, evidence based practice  Outcome monitoring methods: self-report, checklist/rating scale  Therapeutic intervention type: behavior modifying psychotherapy, supportive psychotherapy    Risk parameters:  Patient reports no suicidal ideation  Patient reports no homicidal ideation  Patient reports no self-injurious behavior  Patient reports no violent behavior    Verbal deficits: None    Patient's response to intervention:  The patient's response to intervention is accepting.    Progress toward goals and other mental status changes:  The patient's progress toward goals is limited.    Diagnosis:   No diagnosis found.    Plan: Pt plans to continue individual psychotherapy    Return to clinic: 2 weeks    Length of Service (minutes): 30

## 2022-10-05 ENCOUNTER — PATIENT MESSAGE (OUTPATIENT)
Dept: PRIMARY CARE CLINIC | Facility: CLINIC | Age: 32
End: 2022-10-05

## 2022-10-06 ENCOUNTER — PATIENT MESSAGE (OUTPATIENT)
Dept: BARIATRICS | Facility: CLINIC | Age: 32
End: 2022-10-06

## 2022-10-14 ENCOUNTER — OFFICE VISIT (OUTPATIENT)
Dept: PRIMARY CARE CLINIC | Facility: CLINIC | Age: 32
End: 2022-10-14
Attending: INTERNAL MEDICINE
Payer: COMMERCIAL

## 2022-10-14 VITALS
WEIGHT: 238.75 LBS | HEART RATE: 99 BPM | OXYGEN SATURATION: 100 % | HEIGHT: 62 IN | BODY MASS INDEX: 43.94 KG/M2 | DIASTOLIC BLOOD PRESSURE: 88 MMHG | SYSTOLIC BLOOD PRESSURE: 132 MMHG

## 2022-10-14 DIAGNOSIS — F41.1 GENERALIZED ANXIETY DISORDER: ICD-10-CM

## 2022-10-14 DIAGNOSIS — G43.009 MIGRAINE WITHOUT AURA AND WITHOUT STATUS MIGRAINOSUS, NOT INTRACTABLE: ICD-10-CM

## 2022-10-14 DIAGNOSIS — E66.01 CLASS 3 SEVERE OBESITY DUE TO EXCESS CALORIES WITHOUT SERIOUS COMORBIDITY WITH BODY MASS INDEX (BMI) OF 40.0 TO 44.9 IN ADULT: ICD-10-CM

## 2022-10-14 DIAGNOSIS — Z00.00 HEALTHCARE MAINTENANCE: ICD-10-CM

## 2022-10-14 DIAGNOSIS — K21.9 GASTROESOPHAGEAL REFLUX DISEASE WITHOUT ESOPHAGITIS: ICD-10-CM

## 2022-10-14 PROCEDURE — 3044F PR MOST RECENT HEMOGLOBIN A1C LEVEL <7.0%: ICD-10-PCS | Mod: CPTII,S$GLB,, | Performed by: INTERNAL MEDICINE

## 2022-10-14 PROCEDURE — 1159F PR MEDICATION LIST DOCUMENTED IN MEDICAL RECORD: ICD-10-PCS | Mod: CPTII,S$GLB,, | Performed by: INTERNAL MEDICINE

## 2022-10-14 PROCEDURE — 1160F RVW MEDS BY RX/DR IN RCRD: CPT | Mod: CPTII,S$GLB,, | Performed by: INTERNAL MEDICINE

## 2022-10-14 PROCEDURE — 99499 UNLISTED E&M SERVICE: CPT | Mod: S$GLB,,, | Performed by: INTERNAL MEDICINE

## 2022-10-14 PROCEDURE — 1160F PR REVIEW ALL MEDS BY PRESCRIBER/CLIN PHARMACIST DOCUMENTED: ICD-10-PCS | Mod: CPTII,S$GLB,, | Performed by: INTERNAL MEDICINE

## 2022-10-14 PROCEDURE — 99999 PR PBB SHADOW E&M-EST. PATIENT-LVL III: ICD-10-PCS | Mod: PBBFAC,,, | Performed by: INTERNAL MEDICINE

## 2022-10-14 PROCEDURE — 3079F PR MOST RECENT DIASTOLIC BLOOD PRESSURE 80-89 MM HG: ICD-10-PCS | Mod: CPTII,S$GLB,, | Performed by: INTERNAL MEDICINE

## 2022-10-14 PROCEDURE — 3079F DIAST BP 80-89 MM HG: CPT | Mod: CPTII,S$GLB,, | Performed by: INTERNAL MEDICINE

## 2022-10-14 PROCEDURE — 3075F PR MOST RECENT SYSTOLIC BLOOD PRESS GE 130-139MM HG: ICD-10-PCS | Mod: CPTII,S$GLB,, | Performed by: INTERNAL MEDICINE

## 2022-10-14 PROCEDURE — 1159F MED LIST DOCD IN RCRD: CPT | Mod: CPTII,S$GLB,, | Performed by: INTERNAL MEDICINE

## 2022-10-14 PROCEDURE — 3044F HG A1C LEVEL LT 7.0%: CPT | Mod: CPTII,S$GLB,, | Performed by: INTERNAL MEDICINE

## 2022-10-14 PROCEDURE — 99499 NO LOS: ICD-10-PCS | Mod: S$GLB,,, | Performed by: INTERNAL MEDICINE

## 2022-10-14 PROCEDURE — 3075F SYST BP GE 130 - 139MM HG: CPT | Mod: CPTII,S$GLB,, | Performed by: INTERNAL MEDICINE

## 2022-10-14 PROCEDURE — 99999 PR PBB SHADOW E&M-EST. PATIENT-LVL III: CPT | Mod: PBBFAC,,, | Performed by: INTERNAL MEDICINE

## 2022-10-14 NOTE — ASSESSMENT & PLAN NOTE
Has not been using bitesnap lately, but again discussed importance   She tells me that she will start using it regularly  Declines medications at this time  HDL was 40, but hasn't started exercising yet  Making some dietary modifications

## 2022-10-14 NOTE — ASSESSMENT & PLAN NOTE
Good response to omeprazole  Needs to complete a whole course of therapy  Then will stop and see if we can achieve control of symptoms through weight loss

## 2022-10-14 NOTE — PROGRESS NOTES
Intensive Primary Care Provider Appointment  Shared Note: [facility] David & Sandip DILLON)    Subjective:      Patient ID: Radha Kee is a 32 y.o. female.    Chief Complaint: Follow-up      HPI:    Follow-up  Pertinent negatives include no abdominal pain, chest pain, coughing, myalgias, nausea or vomiting.   Here for follow-up.    Has been visiting with LCSW, feels like that is going well for her.  Has had quite a few life stressors which has been difficult to manage.      She saw dietitian once and felt like it went well.  Has started making some dietary changes, starting with cutting out excess liquid calories.  She admits to continued boredom eating that hasn't really improved yet.     Has not been exercising yet.      She stopped taking the omeprazole for a couple of weeks.  It was was helping, then she forgot to take it for a couple of weeks, then starting feeling some tingling in the back of her throat and so she resumed.        Health goals:  1)  Lose weight  2)  Be less anxious  3)  Avoid getting BETANCOURT and DM    Atherosclerotic Cardiovascular Disease (ASCVD) Risk Score  The ASCVD Risk score (Kortney ALMARAZ, et al., 2019) failed to calculate for the following reasons:    The 2019 ASCVD risk score is only valid for ages 40 to 79    Past Medical History  Past Medical History:   Diagnosis Date    Abnormal Pap smear of cervix 2019    + HR HPV    Allergy     History of acne     Migraine headache     without aura       Past Surgical History  Past Surgical History:   Procedure Laterality Date    BUNIONECTOMY  2005    bilateral        Social History  Social History     Socioeconomic History    Marital status: Single   Occupational History    Occupation: Student   Tobacco Use    Smoking status: Never    Smokeless tobacco: Never   Substance and Sexual Activity    Alcohol use: Yes     Alcohol/week: 1.0 standard drink     Types: 1 Glasses of wine per week     Comment: social    Drug use: Never    Sexual activity:  Yes     Partners: Male     Birth control/protection: None, Condom     Comment: condom use occasional;   Other Topics Concern    Are you pregnant or think you may be? No    Breast-feeding No       Family History  Family History   Problem Relation Age of Onset    Hypertension Mother     Liver disease Father     Cataracts Father     Amenorrhea Sister     Acne Brother     Lymphoma Maternal Grandmother     Diabetes Maternal Grandfather     Cancer Maternal Grandfather         skin    Hypertension Maternal Grandfather     Melanoma Maternal Grandfather     Stroke Paternal Grandfather     Heart attack Paternal Grandfather 50    Diabetes Maternal Aunt     Hypertension Maternal Aunt     Diabetes Maternal Aunt     Hypertension Maternal Aunt     Diabetes Maternal Uncle     Hypertension Maternal Uncle     Breast cancer Other 60        paternal great aunt    Amblyopia Neg Hx     Blindness Neg Hx     Glaucoma Neg Hx     Macular degeneration Neg Hx     Retinal detachment Neg Hx     Strabismus Neg Hx     Thyroid disease Neg Hx     Colon cancer Neg Hx     Ovarian cancer Neg Hx     Psoriasis Neg Hx     Lupus Neg Hx     Eczema Neg Hx         Review of Systems  Review of Systems   Constitutional:  Positive for malaise/fatigue.   Respiratory:  Negative for cough and shortness of breath.    Cardiovascular:  Negative for chest pain.   Gastrointestinal:  Negative for abdominal pain, heartburn (better), nausea and vomiting.   Genitourinary:  Negative for dysuria and hematuria.   Musculoskeletal:  Negative for joint pain and myalgias.   Psychiatric/Behavioral:  Negative for depression. The patient is nervous/anxious (making progress). The patient does not have insomnia.        Objective:   Mental Health Screening  PHQ-9       MITESH-7  Generalized Anxiety Disorder 7-item (MITESH-7) Scale. HOW OFTEN DURING THE PAST 2 WEEKS HAVE YOU FELT BOTHERED BY:  1. Feeling nervous, anxious, or on edge?: More than half the days  2. Not being able to stop or  "control worrying?: Not at all  3. Worrying too much about different things?: Not at all  4. Trouble relaxing?: More than half the days  5. Being so restless that it is hard to sit still?: More than half the days  6. Becoming easily annoyed or irritable?: Several days  7. Feeling afraid as if something awful might happen?: Not at all  8. If you checked off any problems, how difficult have these problems made it for you to do your work, take care of things at home, or get along with other people?: Somewhat difficult  MITESH-7 Score: 7  Number answered (out of first 7): 7  Interpretation: Mild Anxiety    Vital Signs  /88 (BP Location: Right arm, Patient Position: Sitting, BP Method: Medium (Manual))   Pulse 99   Ht 5' 2" (1.575 m)   Wt 108.3 kg (238 lb 12.1 oz)   SpO2 100%   BMI 43.67 kg/m²   Physical Exam  General:  Well-developed, well-nourished, no acute distress  Head:  Normocephalic, atraumatic  Eyes:  PERRL, conjunctiva pink without hemorrhages or petechiae, anicteric sclera, no episcleritis  Nose:  Normal turbinates, pink mucosa without purulent nasal discharge  Mouth:  Moist mucous membranes, no pharyngeal exudates or erythema  Neck:: Supple, normal ROM; No carotid bruit with normal carotid upstroke; No JVD  Chest:  clear to auscultation bilaterally, no respiratory distress  Heart:  Regular rate and rhythm without murmur, rub, or gallop  Abdomen:  Soft, nontender, no distension, normoactive bowel sounds, no abdominal bruit  Extremities:  No clubbing, cyanosis, or edema  Neuro:  Alert and oriented, no focal deficits  Physical Exam      Assessment:   32 y.o. female here for primary care visit       Plan:   Generalized anxiety disorder  GAD7 score improved from 11 to 7  She will continue to see Mina SAEED at this point in time  Discussed mindfulness/meditation both for alleviation of anxiety as well as boredom related eating      Class 3 severe obesity due to excess calories without " serious comorbidity with body mass index (BMI) of 40.0 to 44.9 in adult  Has not been using bitesnap lately, but again discussed importance   She tells me that she will start using it regularly  Declines medications at this time  HDL was 40, but hasn't started exercising yet  Making some dietary modifications      Gastroesophageal reflux disease without esophagitis  Good response to omeprazole  Needs to complete a whole course of therapy  Then will stop and see if we can achieve control of symptoms through weight loss    Migraine without aura and without status migrainosus, not intractable  Has not taken any cafergot yet  Has had a few mild headaches, but no HA's that she feels were severe enough to call a migraine    Healthcare maintenance  Listed as being due for tetanus -- she will check her immunization records and let us know  She would be due for Tdap the next time she is due  Will be getting influenza soon       Follow up in about 3 months (around 1/14/2023).    No orders of the defined types were placed in this encounter.      Health Maintenance  Health Maintenance         Date Due Completion Date    TETANUS VACCINE Never done ---    COVID-19 Vaccine (3 - Booster for Pfizer series) 10/09/2021 8/14/2021    Influenza Vaccine (1) 09/01/2022 1/31/2022    Cervical Cancer Screening 08/04/2026 8/4/2021            ROSA Martini MD/[facility]  Internal Medicine  [facility]  [phone]

## 2022-10-14 NOTE — ASSESSMENT & PLAN NOTE
Has not taken any cafergot yet  Has had a few mild headaches, but no HA's that she feels were severe enough to call a migraine

## 2022-10-14 NOTE — ASSESSMENT & PLAN NOTE
Listed as being due for tetanus -- she will check her immunization records and let us know  She would be due for Tdap the next time she is due  Will be getting influenza soon    Health goals:  1)  Lose weight  2)  Be less anxious  3)  Avoid getting BETANCOURT and DM

## 2022-10-14 NOTE — ASSESSMENT & PLAN NOTE
GAD7 score improved from 11 to 7  She will continue to see LCSWMina SSRI at this point in time  Discussed mindfulness/meditation both for alleviation of anxiety as well as boredom related eating

## 2022-10-14 NOTE — PATIENT INSTRUCTIONS
PLEASE REMEMBER TO CALLL US FIRST with any medical questions or concerns. We try very hard to keep you out of the emergency room if we are able to see you and help with your concern. It is one of our many ways to keep our patients healthy.   For now the best way to reach us is by calling [facility]'s direct number:  CALL OUR OFFICE AT: [phone]    In the months to come we are also hoping to be available for scheduling via the portal.  Dr. Ender Soria and KAI Payne are both here and available most days of the week if you need to be seen in person.  I am here every Friday but can also make myself available on other days if necessary as long as I have enough notice.  Please do not ever hesitate to reach to me via the patient portal.      Please let us know if you have any challenges getting scheduled with our behavioral therapist/ or dietitian or if you have any difficulty with any new medication(s) that were prescribed.     Dr. GUTIERREZ

## 2022-10-19 ENCOUNTER — PATIENT MESSAGE (OUTPATIENT)
Dept: PRIMARY CARE CLINIC | Facility: CLINIC | Age: 32
End: 2022-10-19

## 2022-10-19 ENCOUNTER — DOCUMENTATION ONLY (OUTPATIENT)
Dept: PRIMARY CARE CLINIC | Facility: CLINIC | Age: 32
End: 2022-10-19

## 2022-10-19 NOTE — PROGRESS NOTES
"Individual Psychotherapy (LCSW/PhD)  Radha Pickettmagen,  10/19/2022    Site:  [facility]         Therapeutic Intervention: Met with patient for individual psychotherapy.    Chief complaint/reason for encounter: interpersonal and behavior     Interval history and content of current session: Pt states they have been experiencing less stress as their aunt is receiving additional support. Pt has not begun mindfulness and yoga but will like to begin soon. SW and pt discussed goals and importance of balance in their self-care. Pt states they would limit to have more quality time with friends form support. Pt and SW discussed short term goals.    Social engagement, setup board game night with friends and family anticipated every 6-8 weeks  Mindfulness and Yoga  Diet: 2 days "typical meal" 2 days salad with un-sweet tea    Treatment plan:  Target symptoms: work stress, behavior  Why chosen therapy is appropriate versus another modality: relevant to diagnosis, evidence based practice  Outcome monitoring methods: self-report, observation, checklist/rating scale  Therapeutic intervention type: behavior modifying psychotherapy, supportive psychotherapy    Risk parameters:  Patient reports no suicidal ideation  Patient reports no homicidal ideation  Patient reports no self-injurious behavior  Patient reports no violent behavior    Verbal deficits: None    Patient's response to intervention:  The patient's response to intervention is accepting.    Progress toward goals and other mental status changes:  The patient's progress toward goals is limited.    Diagnosis:   No diagnosis found.    Plan: Pt plans to continue individual psychotherapy    Return to clinic: 2 weeks    Length of Service (minutes): 30       "

## 2022-10-26 ENCOUNTER — OFFICE VISIT (OUTPATIENT)
Dept: OBSTETRICS AND GYNECOLOGY | Facility: CLINIC | Age: 32
End: 2022-10-26
Payer: COMMERCIAL

## 2022-10-26 VITALS
HEIGHT: 62 IN | BODY MASS INDEX: 43.69 KG/M2 | SYSTOLIC BLOOD PRESSURE: 132 MMHG | DIASTOLIC BLOOD PRESSURE: 70 MMHG | WEIGHT: 237.44 LBS

## 2022-10-26 DIAGNOSIS — Z12.4 PAP SMEAR FOR CERVICAL CANCER SCREENING: ICD-10-CM

## 2022-10-26 DIAGNOSIS — Z01.419 ENCOUNTER FOR GYNECOLOGICAL EXAMINATION WITHOUT ABNORMAL FINDING: Primary | ICD-10-CM

## 2022-10-26 DIAGNOSIS — Z30.09 GENERAL COUNSELING AND ADVICE ON FEMALE CONTRACEPTION: ICD-10-CM

## 2022-10-26 PROCEDURE — 1160F PR REVIEW ALL MEDS BY PRESCRIBER/CLIN PHARMACIST DOCUMENTED: ICD-10-PCS | Mod: CPTII,S$GLB,, | Performed by: OBSTETRICS & GYNECOLOGY

## 2022-10-26 PROCEDURE — 3044F HG A1C LEVEL LT 7.0%: CPT | Mod: CPTII,S$GLB,, | Performed by: OBSTETRICS & GYNECOLOGY

## 2022-10-26 PROCEDURE — 99999 PR PBB SHADOW E&M-EST. PATIENT-LVL III: ICD-10-PCS | Mod: PBBFAC,,, | Performed by: OBSTETRICS & GYNECOLOGY

## 2022-10-26 PROCEDURE — 99395 PR PREVENTIVE VISIT,EST,18-39: ICD-10-PCS | Mod: S$GLB,,, | Performed by: OBSTETRICS & GYNECOLOGY

## 2022-10-26 PROCEDURE — 99395 PREV VISIT EST AGE 18-39: CPT | Mod: S$GLB,,, | Performed by: OBSTETRICS & GYNECOLOGY

## 2022-10-26 PROCEDURE — 3078F PR MOST RECENT DIASTOLIC BLOOD PRESSURE < 80 MM HG: ICD-10-PCS | Mod: CPTII,S$GLB,, | Performed by: OBSTETRICS & GYNECOLOGY

## 2022-10-26 PROCEDURE — 3078F DIAST BP <80 MM HG: CPT | Mod: CPTII,S$GLB,, | Performed by: OBSTETRICS & GYNECOLOGY

## 2022-10-26 PROCEDURE — 88175 CYTOPATH C/V AUTO FLUID REDO: CPT | Performed by: OBSTETRICS & GYNECOLOGY

## 2022-10-26 PROCEDURE — 1160F RVW MEDS BY RX/DR IN RCRD: CPT | Mod: CPTII,S$GLB,, | Performed by: OBSTETRICS & GYNECOLOGY

## 2022-10-26 PROCEDURE — 87625 HPV TYPES 16 & 18 ONLY: CPT | Performed by: OBSTETRICS & GYNECOLOGY

## 2022-10-26 PROCEDURE — 3075F SYST BP GE 130 - 139MM HG: CPT | Mod: CPTII,S$GLB,, | Performed by: OBSTETRICS & GYNECOLOGY

## 2022-10-26 PROCEDURE — 1159F MED LIST DOCD IN RCRD: CPT | Mod: CPTII,S$GLB,, | Performed by: OBSTETRICS & GYNECOLOGY

## 2022-10-26 PROCEDURE — 3075F PR MOST RECENT SYSTOLIC BLOOD PRESS GE 130-139MM HG: ICD-10-PCS | Mod: CPTII,S$GLB,, | Performed by: OBSTETRICS & GYNECOLOGY

## 2022-10-26 PROCEDURE — 87624 HPV HI-RISK TYP POOLED RSLT: CPT | Performed by: OBSTETRICS & GYNECOLOGY

## 2022-10-26 PROCEDURE — 99999 PR PBB SHADOW E&M-EST. PATIENT-LVL III: CPT | Mod: PBBFAC,,, | Performed by: OBSTETRICS & GYNECOLOGY

## 2022-10-26 PROCEDURE — 1159F PR MEDICATION LIST DOCUMENTED IN MEDICAL RECORD: ICD-10-PCS | Mod: CPTII,S$GLB,, | Performed by: OBSTETRICS & GYNECOLOGY

## 2022-10-26 PROCEDURE — 3044F PR MOST RECENT HEMOGLOBIN A1C LEVEL <7.0%: ICD-10-PCS | Mod: CPTII,S$GLB,, | Performed by: OBSTETRICS & GYNECOLOGY

## 2022-11-02 ENCOUNTER — PATIENT MESSAGE (OUTPATIENT)
Dept: PRIMARY CARE CLINIC | Facility: CLINIC | Age: 32
End: 2022-11-02

## 2022-11-03 LAB
CLINICAL INFO: ABNORMAL
CYTO CVX: ABNORMAL
CYTOLOGIST CVX/VAG CYTO: ABNORMAL
CYTOLOGIST CVX/VAG CYTO: ABNORMAL
CYTOLOGY CMNT CVX/VAG CYTO-IMP: ABNORMAL
CYTOLOGY PAP THIN PREP EXPLANATION: ABNORMAL
DATE OF PREVIOUS PAP: ABNORMAL
DATE PREVIOUS BX: YES
GEN CATEG CVX/VAG CYTO-IMP: ABNORMAL
HPV I/H RISK 4 DNA CVX QL NAA+PROBE: DETECTED
HPV16 DNA CVX QL PROBE+SIG AMP: NORMAL
HPV18 DNA CVX QL PROBE+SIG AMP: NORMAL
LMP START DATE: ABNORMAL
MICROORGANISM CVX/VAG CYTO: ABNORMAL
PATHOLOGIST CVX/VAG CYTO: ABNORMAL
SERVICE CMNT-IMP: ABNORMAL
SPECIMEN SOURCE CVX/VAG CYTO: ABNORMAL
STAT OF ADQ CVX/VAG CYTO-IMP: ABNORMAL

## 2022-11-07 ENCOUNTER — NUTRITION (OUTPATIENT)
Dept: NUTRITION | Facility: CLINIC | Age: 32
End: 2022-11-07
Payer: COMMERCIAL

## 2022-11-07 DIAGNOSIS — E66.01 CLASS 3 SEVERE OBESITY DUE TO EXCESS CALORIES WITHOUT SERIOUS COMORBIDITY WITH BODY MASS INDEX (BMI) OF 40.0 TO 44.9 IN ADULT: ICD-10-CM

## 2022-11-07 DIAGNOSIS — K21.9 GASTROESOPHAGEAL REFLUX DISEASE WITHOUT ESOPHAGITIS: Primary | ICD-10-CM

## 2022-11-07 PROCEDURE — 99999 PR PBB SHADOW E&M-EST. PATIENT-LVL I: ICD-10-PCS | Mod: PBBFAC,,, | Performed by: DIETITIAN, REGISTERED

## 2022-11-07 PROCEDURE — 97803 PR MED NUTR THER, SUBSQ, INDIV, EA 15 MIN: ICD-10-PCS | Mod: S$GLB,,, | Performed by: DIETITIAN, REGISTERED

## 2022-11-07 PROCEDURE — 97803 MED NUTRITION INDIV SUBSEQ: CPT | Mod: S$GLB,,, | Performed by: DIETITIAN, REGISTERED

## 2022-11-07 PROCEDURE — 99999 PR PBB SHADOW E&M-EST. PATIENT-LVL I: CPT | Mod: PBBFAC,,, | Performed by: DIETITIAN, REGISTERED

## 2022-11-07 NOTE — PROGRESS NOTES
Nutrition Assessment  Session Time:  75 minutes      Client name:  Radha Kee  :  1990  Age:  32 y.o.  Gender:  female    Client states she is here for her first follow-up visit with me for nutrition counseling for weight loss. PMH MITESH, chronic migraines, GERD, obesity BMI >40. Ms. Kee shares today she has recently re-gained back more personal time as her grandmother is being taken care of in an assisted living facility and her aunt is doing much better. She shares she has made some improvements in her diet by being more health-conscious when purchasing foods, and decreasing the total number of her caloric beverages. She does admit she will make trade-offs with herself (skip latte to have have Coke later) and continues to drink Coke mainly as a coping mechanism for afternoon headaches. She also shares she  tried the new [employer] latte recipe we came up with during our initial consult, but was not a fan. Ms. Kee continues to not have a great sleep routine, going to bed at 12:30am/1am and getting up with the dogs around 6:30am. When asked if she would consider going to bed earlier, she expressed she has no interest as she is used to her current routine. She also has no set physical activity routine and although was a bit active this October (), baby sitting her 18-month-old niece who was living with her the first three 3 weeks in October, she did not accomplish her goal of walking her dogs every weekend. Today we are reviewing health goals and creating new challenges for this coming month to help her add healthy habits to her routine.    Anthropometrics (from 2022)  Height:  62 inches                     Weight:  240.2 pounds  BMI:  43.9  % Body Fat:  N/A  Waist circumference: 45 inches    Clinical Signs/Symptoms  N/V/D:  none  Appetite:  good       Past Medical History:   Diagnosis Date    Abnormal Pap smear of cervix 2019    + HR HPV    Allergy     History of acne     Migraine  headache     without aura       Past Surgical History:   Procedure Laterality Date    BUNIONECTOMY  2005    bilateral        Medications    has a current medication list which includes the following prescription(s): ergotamine-caffeine 1-100 mg and omeprazole.    Vitamins, Minerals, and/or Supplements:  none. Vit D inconsistently     Food/Medication Interactions:  Reviewed     Food Allergies or Intolerances:  NKFA     Social History    Marital status:  Single  Employment:  [employer]    Social History     Tobacco Use    Smoking status: Never    Smokeless tobacco: Never   Substance Use Topics    Alcohol use: Yes     Alcohol/week: 1.0 standard drink     Types: 1 Glasses of wine per week     Comment: social        Lab Reports   Sodium   Date Value Ref Range Status   09/06/2022 136 136 - 145 mmol/L Final     Potassium   Date Value Ref Range Status   09/06/2022 3.9 3.5 - 5.1 mmol/L Final     Chloride   Date Value Ref Range Status   09/06/2022 105 95 - 110 mmol/L Final     CO2   Date Value Ref Range Status   09/06/2022 23 23 - 29 mmol/L Final     Glucose   Date Value Ref Range Status   09/06/2022 96 70 - 110 mg/dL Final     BUN   Date Value Ref Range Status   09/06/2022 8 6 - 20 mg/dL Final     Creatinine   Date Value Ref Range Status   09/06/2022 0.8 0.5 - 1.4 mg/dL Final     Calcium   Date Value Ref Range Status   09/06/2022 9.3 8.7 - 10.5 mg/dL Final     Total Protein   Date Value Ref Range Status   09/06/2022 8.1 6.0 - 8.4 g/dL Final     Albumin   Date Value Ref Range Status   09/06/2022 4.0 3.5 - 5.2 g/dL Final     Total Bilirubin   Date Value Ref Range Status   09/06/2022 0.7 0.1 - 1.0 mg/dL Final     Comment:     For infants and newborns, interpretation of results should be based  on gestational age, weight and in agreement with clinical  observations.    Premature Infant recommended reference ranges:  Up to 24 hours.............<8.0 mg/dL  Up to 48 hours............<12.0 mg/dL  3-5 days..................<15.0  mg/dL  6-29 days.................<15.0 mg/dL       Alkaline Phosphatase   Date Value Ref Range Status   09/06/2022 85 55 - 135 U/L Final     AST   Date Value Ref Range Status   09/06/2022 16 10 - 40 U/L Final     ALT   Date Value Ref Range Status   09/06/2022 23 10 - 44 U/L Final     Anion Gap   Date Value Ref Range Status   09/06/2022 8 8 - 16 mmol/L Final     eGFR if    Date Value Ref Range Status   12/16/2020 >60.0 >60 mL/min/1.73 m^2 Final     eGFR if non    Date Value Ref Range Status   12/16/2020 >60.0 >60 mL/min/1.73 m^2 Final     Comment:     Calculation used to obtain the estimated glomerular filtration  rate (eGFR) is the CKD-EPI equation.         Lab Results   Component Value Date    WBC 18.56 (H) 08/27/2021    HGB 13.7 08/27/2021    HCT 41 08/27/2021    MCV 88 08/27/2021     08/27/2021        Lab Results   Component Value Date    CHOL 154 09/06/2022     Lab Results   Component Value Date    HDL 40 09/06/2022     Lab Results   Component Value Date    LDLCALC 105.0 09/06/2022     Lab Results   Component Value Date    TRIG 45 09/06/2022     Lab Results   Component Value Date    CHOLHDL 26.0 09/06/2022     Lab Results   Component Value Date    HGBA1C 5.1 09/06/2022     BP Readings from Last 1 Encounters:   10/26/22 132/70       Food History  Breakfast:  latte / nothing / unsweet tea / bagel (cinnamon raisin) w cream cheese  Mid-morning Snack:  none  Lunch:  [employer] / [employer] (nachos, salad, burger and fries)  Mid-afternoon Snack:  none / Coke  Dinner:  8/9pm Fast Food ([employer] donovan salad w one ranch and one avocado ranch/ [employer] cheesburger, fries and coke / [employer] cheesy gordita crunch and chicken quesadilla with Pepsi) / [employer] salad w fried chicken and ranch, sometimes spinach dip with chips, salsa and sour cream + water/ sweet tea  Snack:  10/11pm Frozen fruit   *Fluid intake:  water, coffee, coke, pepsi, unsweet tea, 1 glass alcohol /month  (champagne)    Exercise History:  No set physical activity routine    Cultural/Spiritual/Personal Preferences:  None identified    Support System:  friends, parents    State of Change:  Preparation    Barriers to Change:  none    Diagnosis    Continues:  Overweight/ Obesity related to excessive energy intake and physical inactivity as evidenced by BMI 43.9, waist circumference 45 inches.    Intervention    RMR (Method:  [place] [address] [place]isha):  1766 kcal  Activity Factor:   1.2     CAT:  2100 kcal - 500 (wt loss) = 1600 kcal    Goals:  1.  Think about an activity that involves movement that you would enjoy doing every single week.   2.  Switch Coke/Pepsi to diet Coke / Zevia cola, also include water daily  3.  Slowly increase fiber intake from fiber-rich foods (list provided)  4.  Review handouts provided    Nutrition Education  The following education was provided to the patient:  Discussed meal planning/[employer] My Plate design.  Discussed label reading.  Discussed weight management.  Discussed GERD Nutrition Therapy.  Suggested dietary modifications based on current dietary behaviors and individual food preferences.  Discussed physical activity guidelines and its associated benefits.  Discussed tips for eating healthy when dining out.  Discussed sugary foods and/or beverages (potential health consequences of excessive sugar intake, ways to reduce sugar intake, healthy beverage alternatives, etc.).  Discussed recommended servings of non-starchy vegetables/day and food sources of such.  Discussed recommended fiber intake and food sources of such.  Discussed [facility] client resources (may include but not limited to [facility] Eat Fit Shopping List, Fueling Well on the Go, Lite Restaurant Guide, and Meal Planning Guide).  Discussed importance of small behavior/habit changes in improving long-term adherence and sustainability.  Discussed goal setting.  Provided ongoing support, encouragement, and guidance toward improved health  efforts.  Patient verbalized understanding of nutrition education and recommendations received.    Handouts Provided  Meal Planning Guide  Eat Fit Shopping List  Portion Distortion  GERD Therapy  High-Fiber Foods List  Reading Nutrition Labels  Breakfast Ideas    Monitoring/Evaluation    Monitor the following:  Weight  BMI  % Body Fat  Caloric intake  Labs:  Vit D     Follow Up Plan:  Will communicate with referring healthcare provider for monitoring and follow-up.  Next Appointment: In four weeks.

## 2022-11-15 ENCOUNTER — PATIENT MESSAGE (OUTPATIENT)
Dept: PRIMARY CARE CLINIC | Facility: CLINIC | Age: 32
End: 2022-11-15

## 2022-11-16 ENCOUNTER — DOCUMENTATION ONLY (OUTPATIENT)
Dept: PRIMARY CARE CLINIC | Facility: CLINIC | Age: 32
End: 2022-11-16

## 2022-11-16 NOTE — PROGRESS NOTES
Individual Psychotherapy (LCSW/PhD)  Radha Pickettmagen,  11/16/2022    Site:  [facility]         Therapeutic Intervention: Met with patient for individual psychotherapy.    Chief complaint/reason for encounter: sleep and behavior     Interval history and content of current session: Pt has been supporting sister who recently is dealing with breakup of their marriage. Pt has been utilizing guided meditation to help with anxiety.Pt states they enjoy guided meditation listening to meditations at night which helps them relax. Pt has been experiencing insomnia several days over last week or so. SW and pt explored sleep hygiene and CBT-I recommendations for sleep drive.     SW and pt discussed incorporating structured exercise I.e. yoga or walking into routine for increase mental and physical health. SW and pt explored past experiences with structured exercise. SW provided pt with list of local yoga practices that align with pt's preferences.    Treatment plan:  Target symptoms:  behavior  Why chosen therapy is appropriate versus another modality: relevant to diagnosis, evidence based practice  Outcome monitoring methods: self-report, checklist/rating scale  Therapeutic intervention type: behavior modifying psychotherapy, supportive psychotherapy    Risk parameters:  Patient reports no suicidal ideation  Patient reports no homicidal ideation  Patient reports no self-injurious behavior  Patient reports no violent behavior    Verbal deficits: None    Patient's response to intervention:  The patient's response to intervention is accepting.    Progress toward goals and other mental status changes:  The patient's progress toward goals is limited.    Diagnosis:   No diagnosis found.    Plan: Pt plans to continue individual psychotherapy    Return to clinic: 1 month    Length of Service (minutes): 45

## 2022-11-29 ENCOUNTER — IMMUNIZATION (OUTPATIENT)
Dept: INTERNAL MEDICINE | Facility: CLINIC | Age: 32
End: 2022-11-29
Payer: COMMERCIAL

## 2022-11-29 PROCEDURE — 90471 FLU VACCINE (QUAD) GREATER THAN OR EQUAL TO 3YO PRESERVATIVE FREE IM: ICD-10-PCS | Mod: S$GLB,,, | Performed by: INTERNAL MEDICINE

## 2022-11-29 PROCEDURE — 90471 IMMUNIZATION ADMIN: CPT | Mod: S$GLB,,, | Performed by: INTERNAL MEDICINE

## 2022-11-29 PROCEDURE — 90686 FLU VACCINE (QUAD) GREATER THAN OR EQUAL TO 3YO PRESERVATIVE FREE IM: ICD-10-PCS | Mod: S$GLB,,, | Performed by: INTERNAL MEDICINE

## 2022-11-29 PROCEDURE — 90686 IIV4 VACC NO PRSV 0.5 ML IM: CPT | Mod: S$GLB,,, | Performed by: INTERNAL MEDICINE

## 2022-12-04 ENCOUNTER — PATIENT MESSAGE (OUTPATIENT)
Dept: PRIMARY CARE CLINIC | Facility: CLINIC | Age: 32
End: 2022-12-04

## 2022-12-05 ENCOUNTER — NUTRITION (OUTPATIENT)
Dept: NUTRITION | Facility: CLINIC | Age: 32
End: 2022-12-05
Payer: COMMERCIAL

## 2022-12-05 ENCOUNTER — TELEPHONE (OUTPATIENT)
Dept: PRIMARY CARE CLINIC | Facility: CLINIC | Age: 32
End: 2022-12-05

## 2022-12-05 DIAGNOSIS — E66.01 CLASS 3 SEVERE OBESITY DUE TO EXCESS CALORIES WITHOUT SERIOUS COMORBIDITY WITH BODY MASS INDEX (BMI) OF 40.0 TO 44.9 IN ADULT: Primary | ICD-10-CM

## 2022-12-05 PROCEDURE — 97803 PR MED NUTR THER, SUBSQ, INDIV, EA 15 MIN: ICD-10-PCS | Mod: S$GLB,,, | Performed by: DIETITIAN, REGISTERED

## 2022-12-05 PROCEDURE — 99999 PR PBB SHADOW E&M-EST. PATIENT-LVL I: ICD-10-PCS | Mod: PBBFAC,,, | Performed by: DIETITIAN, REGISTERED

## 2022-12-05 PROCEDURE — 97803 MED NUTRITION INDIV SUBSEQ: CPT | Mod: S$GLB,,, | Performed by: DIETITIAN, REGISTERED

## 2022-12-05 PROCEDURE — 99999 PR PBB SHADOW E&M-EST. PATIENT-LVL I: CPT | Mod: PBBFAC,,, | Performed by: DIETITIAN, REGISTERED

## 2022-12-05 NOTE — PROGRESS NOTES
Nutrition Assessment  Session Time:  90 minutes      Client name:  Radha Kee  :  1990  Age:  32 y.o.  Gender:  female    Client states she is here for her second follow-up visit with me for nutrition counseling for weight loss. Ms. Kee shares today she has been consistent on reducing her caloric-beverage intake, but has not yet considered any new physical activities. Ms. Kee works full-time and does not enjoy cooking, she primarily gets the majority of her calories from prepared convenience fast-casual restaurants. She finds it easier to have her food delivered and will use phone apps to order foods. Ms. Kee is engaged in her health journey, but currently does not believe she is ready to make large dietary changes. Today we are reviewing her meal patterns and finding opportunities to create more balanced energy intake. PMH includes MITESH, chronic migraines, GERD, obesity BMI >40.    Anthropometrics  Height:  62 inches     Weight:  235 pounds  BMI:  42.9  % Body Fat:  N/A  Waist Circumference:  44 inches    Clinical Signs/Symptoms  N/V/D:  none. GERD symptoms only return if over-eating  Appetite:  good       Past Medical History:   Diagnosis Date    Abnormal Pap smear of cervix 2019    + HR HPV    Allergy     History of acne     Migraine headache     without aura       Past Surgical History:   Procedure Laterality Date    BUNIONECTOMY  2005    bilateral        Medications    has a current medication list which includes the following prescription(s): ergotamine-caffeine 1-100 mg and omeprazole.    Vitamins, Minerals, and/or Supplements:  none. Vit D inconsistently      Food/Medication Interactions:  Reviewed     Food Allergies or Intolerances:  NKFA     Social History    Marital status:  Single  Employment:  [employer]    Social History     Tobacco Use    Smoking status: Never    Smokeless tobacco: Never   Substance Use Topics    Alcohol use: Yes     Alcohol/week: 1.0 standard drink     Types:  1 Glasses of wine per week     Comment: social        Lab Reports   Sodium   Date Value Ref Range Status   09/06/2022 136 136 - 145 mmol/L Final     Potassium   Date Value Ref Range Status   09/06/2022 3.9 3.5 - 5.1 mmol/L Final     Chloride   Date Value Ref Range Status   09/06/2022 105 95 - 110 mmol/L Final     CO2   Date Value Ref Range Status   09/06/2022 23 23 - 29 mmol/L Final     Glucose   Date Value Ref Range Status   09/06/2022 96 70 - 110 mg/dL Final     BUN   Date Value Ref Range Status   09/06/2022 8 6 - 20 mg/dL Final     Creatinine   Date Value Ref Range Status   09/06/2022 0.8 0.5 - 1.4 mg/dL Final     Calcium   Date Value Ref Range Status   09/06/2022 9.3 8.7 - 10.5 mg/dL Final     Total Protein   Date Value Ref Range Status   09/06/2022 8.1 6.0 - 8.4 g/dL Final     Albumin   Date Value Ref Range Status   09/06/2022 4.0 3.5 - 5.2 g/dL Final     Total Bilirubin   Date Value Ref Range Status   09/06/2022 0.7 0.1 - 1.0 mg/dL Final     Comment:     For infants and newborns, interpretation of results should be based  on gestational age, weight and in agreement with clinical  observations.    Premature Infant recommended reference ranges:  Up to 24 hours.............<8.0 mg/dL  Up to 48 hours............<12.0 mg/dL  3-5 days..................<15.0 mg/dL  6-29 days.................<15.0 mg/dL       Alkaline Phosphatase   Date Value Ref Range Status   09/06/2022 85 55 - 135 U/L Final     AST   Date Value Ref Range Status   09/06/2022 16 10 - 40 U/L Final     ALT   Date Value Ref Range Status   09/06/2022 23 10 - 44 U/L Final     Anion Gap   Date Value Ref Range Status   09/06/2022 8 8 - 16 mmol/L Final     eGFR if    Date Value Ref Range Status   12/16/2020 >60.0 >60 mL/min/1.73 m^2 Final     eGFR if non    Date Value Ref Range Status   12/16/2020 >60.0 >60 mL/min/1.73 m^2 Final     Comment:     Calculation used to obtain the estimated glomerular filtration  rate (eGFR) is the  CKD-EPI equation.         Lab Results   Component Value Date    WBC 18.56 (H) 08/27/2021    HGB 13.7 08/27/2021    HCT 41 08/27/2021    MCV 88 08/27/2021     08/27/2021        Lab Results   Component Value Date    CHOL 154 09/06/2022     Lab Results   Component Value Date    HDL 40 09/06/2022     Lab Results   Component Value Date    LDLCALC 105.0 09/06/2022     Lab Results   Component Value Date    TRIG 45 09/06/2022     Lab Results   Component Value Date    CHOLHDL 26.0 09/06/2022     Lab Results   Component Value Date    HGBA1C 5.1 09/06/2022     BP Readings from Last 1 Encounters:   10/26/22 132/70       Food History  Breakfast:  latte (black coffe and vanilla premier protein) / bagel + 2 eggs / apple and peanut butter  Mid-morning Snack:  none  Lunch:  [employer] (salad, tacos, fried fish) w diet Coke  Mid-afternoon Snack:  none  Dinner:  [employer] / [employer] / [employer] / Grilled Chicken w [place] sprouts / hummus (Lv's) / lettuce wrap + water / unsweet tea  Snack:  not as often - frozen fruit  *Fluid intake:  water, coffee, coke, pepsi, unsweet tea, 1 glass alcohol /month (champagne)    Exercise History:  No set physical activity routine. Walks her dogs every now and then    Cultural/Spiritual/Personal Preferences:  None identified    Support System:  friends, parents    State of Change:  Preparation    Barriers to Change:  none    Diagnosis    Improving:  Obesity related to excessive energy intake and physical inactivity as evidenced by BMI 42.9, waist circumference 44 inches.    Intervention    RMR (Method:  [place] [address] Neisha):  1766 kcal  Activity Factor:   1.2     CAT:  2100 kcal - 500 (wt loss) = 1600 kcal    Goals:  1.  Aim to reach 6k steps 3-4x a week moving forward.  2.  Continue low intake of caloric beverages (choosing calorie-free options often)  3.  Download Eat Fit more and start trying different options of Eat Fit approved meals/items for lunch and dinner.    Nutrition Education  The  following education was provided to the patient:  Discussed meal planning/[employer] My Plate design.  Discussed weight management.  Suggested dietary modifications based on current dietary behaviors and individual food preferences.  Discussed physical activity guidelines and its associated benefits.  Discussed tips for eating healthy when dining out.  Discussed recommended servings of non-starchy vegetables/day and food sources of such.  Discussed recommended fiber intake and food sources of such.  Discussed benefits of adequate hydration and recommended fluid intake.  Discussed importance of small behavior/habit changes in improving long-term adherence and sustainability.  Discussed goal setting.  Provided ongoing support, encouragement, and guidance toward improved health efforts.  Patient verbalized understanding of nutrition education and recommendations received.    Handouts Provided  None at this visit    Monitoring/Evaluation    Monitor the following:  Weight  BMI  % Body Fat  Caloric intake  Labs:  Vit D     Follow Up Plan:  Will communicate with referring healthcare provider for monitoring and follow-up.  Next Appointment: In four weeks.

## 2022-12-05 NOTE — TELEPHONE ENCOUNTER
Spoke to pt she stated she will try a new OTC med and if that don't work than she will reach out to me to schedule an appt to see. Dr. Soria no appt scheduled per pt

## 2022-12-06 ENCOUNTER — OFFICE VISIT (OUTPATIENT)
Dept: PRIMARY CARE CLINIC | Facility: CLINIC | Age: 32
End: 2022-12-06
Payer: COMMERCIAL

## 2022-12-06 ENCOUNTER — PATIENT MESSAGE (OUTPATIENT)
Dept: PRIMARY CARE CLINIC | Facility: CLINIC | Age: 32
End: 2022-12-06

## 2022-12-06 ENCOUNTER — OFFICE VISIT (OUTPATIENT)
Dept: OPTOMETRY | Facility: CLINIC | Age: 32
End: 2022-12-06
Payer: COMMERCIAL

## 2022-12-06 VITALS
HEART RATE: 88 BPM | TEMPERATURE: 99 F | BODY MASS INDEX: 42.9 KG/M2 | WEIGHT: 234.56 LBS | SYSTOLIC BLOOD PRESSURE: 120 MMHG | DIASTOLIC BLOOD PRESSURE: 84 MMHG | OXYGEN SATURATION: 98 %

## 2022-12-06 DIAGNOSIS — H52.223 REGULAR ASTIGMATISM OF BOTH EYES: Primary | ICD-10-CM

## 2022-12-06 DIAGNOSIS — H10.9 CONJUNCTIVITIS, UNSPECIFIED CONJUNCTIVITIS TYPE, UNSPECIFIED LATERALITY: ICD-10-CM

## 2022-12-06 DIAGNOSIS — H66.90 OTITIS MEDIA, UNSPECIFIED LATERALITY, UNSPECIFIED OTITIS MEDIA TYPE: Primary | ICD-10-CM

## 2022-12-06 PROCEDURE — 1160F PR REVIEW ALL MEDS BY PRESCRIBER/CLIN PHARMACIST DOCUMENTED: ICD-10-PCS | Mod: CPTII,S$GLB,, | Performed by: INTERNAL MEDICINE

## 2022-12-06 PROCEDURE — 99999 PR PBB SHADOW E&M-EST. PATIENT-LVL II: ICD-10-PCS | Mod: PBBFAC,,, | Performed by: OPTOMETRIST

## 2022-12-06 PROCEDURE — 99999 PR PBB SHADOW E&M-EST. PATIENT-LVL III: ICD-10-PCS | Mod: PBBFAC,,, | Performed by: INTERNAL MEDICINE

## 2022-12-06 PROCEDURE — 92014 COMPRE OPH EXAM EST PT 1/>: CPT | Mod: S$GLB,,, | Performed by: OPTOMETRIST

## 2022-12-06 PROCEDURE — 3074F PR MOST RECENT SYSTOLIC BLOOD PRESSURE < 130 MM HG: ICD-10-PCS | Mod: CPTII,S$GLB,, | Performed by: INTERNAL MEDICINE

## 2022-12-06 PROCEDURE — 92015 PR REFRACTION: ICD-10-PCS | Mod: S$GLB,,, | Performed by: OPTOMETRIST

## 2022-12-06 PROCEDURE — 1159F PR MEDICATION LIST DOCUMENTED IN MEDICAL RECORD: ICD-10-PCS | Mod: CPTII,S$GLB,, | Performed by: INTERNAL MEDICINE

## 2022-12-06 PROCEDURE — 3008F PR BODY MASS INDEX (BMI) DOCUMENTED: ICD-10-PCS | Mod: CPTII,S$GLB,, | Performed by: INTERNAL MEDICINE

## 2022-12-06 PROCEDURE — 92015 DETERMINE REFRACTIVE STATE: CPT | Mod: S$GLB,,, | Performed by: OPTOMETRIST

## 2022-12-06 PROCEDURE — 3044F PR MOST RECENT HEMOGLOBIN A1C LEVEL <7.0%: ICD-10-PCS | Mod: CPTII,S$GLB,, | Performed by: INTERNAL MEDICINE

## 2022-12-06 PROCEDURE — 3079F PR MOST RECENT DIASTOLIC BLOOD PRESSURE 80-89 MM HG: ICD-10-PCS | Mod: CPTII,S$GLB,, | Performed by: INTERNAL MEDICINE

## 2022-12-06 PROCEDURE — 1160F RVW MEDS BY RX/DR IN RCRD: CPT | Mod: CPTII,S$GLB,, | Performed by: INTERNAL MEDICINE

## 2022-12-06 PROCEDURE — 3074F SYST BP LT 130 MM HG: CPT | Mod: CPTII,S$GLB,, | Performed by: INTERNAL MEDICINE

## 2022-12-06 PROCEDURE — 92014 PR EYE EXAM, EST PATIENT,COMPREHESV: ICD-10-PCS | Mod: S$GLB,,, | Performed by: OPTOMETRIST

## 2022-12-06 PROCEDURE — 3079F DIAST BP 80-89 MM HG: CPT | Mod: CPTII,S$GLB,, | Performed by: INTERNAL MEDICINE

## 2022-12-06 PROCEDURE — 3044F HG A1C LEVEL LT 7.0%: CPT | Mod: CPTII,S$GLB,, | Performed by: INTERNAL MEDICINE

## 2022-12-06 PROCEDURE — 1159F MED LIST DOCD IN RCRD: CPT | Mod: CPTII,S$GLB,, | Performed by: INTERNAL MEDICINE

## 2022-12-06 PROCEDURE — 3008F BODY MASS INDEX DOCD: CPT | Mod: CPTII,S$GLB,, | Performed by: INTERNAL MEDICINE

## 2022-12-06 PROCEDURE — 99999 PR PBB SHADOW E&M-EST. PATIENT-LVL II: CPT | Mod: PBBFAC,,, | Performed by: OPTOMETRIST

## 2022-12-06 PROCEDURE — 99999 PR PBB SHADOW E&M-EST. PATIENT-LVL III: CPT | Mod: PBBFAC,,, | Performed by: INTERNAL MEDICINE

## 2022-12-06 RX ORDER — AZITHROMYCIN 250 MG/1
TABLET, FILM COATED ORAL
Qty: 6 TABLET | Refills: 0 | Status: SHIPPED | OUTPATIENT
Start: 2022-12-06 | End: 2022-12-11

## 2022-12-06 RX ORDER — PREDNISONE 20 MG/1
20 TABLET ORAL DAILY
Qty: 10 TABLET | Refills: 0 | Status: SHIPPED | OUTPATIENT
Start: 2022-12-06 | End: 2022-12-16

## 2022-12-06 NOTE — PROGRESS NOTES
Assessment /Plan     For exam results, see Encounter Report.    Regular astigmatism of both eyes    Use PF systane complete BID/PRN    RTC 1 year DFE

## 2022-12-06 NOTE — PROGRESS NOTES
32-year-old woman with a history of GERD, migraine headache, anxiety, morbid obesity here with a chief complaint of cough and a left earache.      History of present illness and pertinent review of systems:  The patient has a 2 week history.  She has no fever but has had intermittent chills particularly last week.  She notes a headache with her cough.  She has left ear pain but the right ear feels congested.  There is no drainage.  She has no rhinorrhea but does have nasal congestion, a sore throat, and postnasal drip.  She is a cough that is productive of green sputum.  She has no wheezing or pleurisy.  She has no hemoptysis.  She complains of no rash, diarrhea, or myalgias.  There is a question of discharge from her right eye which was crusted this morning.    Remaining review systems is negative.      Physical Exam  Vitals reviewed.   Constitutional:       General: She is not in acute distress.     Appearance: She is obese. She is not ill-appearing.   HENT:      Head: Atraumatic.      Right Ear: Tympanic membrane and ear canal normal. There is no impacted cerumen.      Left Ear: Ear canal normal. There is no impacted cerumen.      Ears:      Comments: Otitis media on the left.     Nose: Congestion present. No rhinorrhea.      Mouth/Throat:      Mouth: Mucous membranes are moist.      Pharynx: Oropharynx is clear. Posterior oropharyngeal erythema present. No oropharyngeal exudate.   Eyes:      General: No scleral icterus.        Right eye: Discharge present.         Left eye: Discharge present.     Comments: Bilateral conjunctivitis   Cardiovascular:      Rate and Rhythm: Normal rate and regular rhythm.      Pulses: Normal pulses.      Heart sounds: Normal heart sounds. No murmur heard.    No gallop.   Pulmonary:      Effort: Pulmonary effort is normal. No respiratory distress.      Breath sounds: Normal breath sounds. No wheezing, rhonchi or rales.   Abdominal:      General: Bowel sounds are normal. There is no  distension.      Palpations: Abdomen is soft.      Tenderness: There is no abdominal tenderness.      Comments: No hepatosplenomegaly.  No inguinal adenopathy   Musculoskeletal:         General: No tenderness.      Cervical back: Neck supple. No tenderness.      Right lower leg: No edema.      Left lower leg: No edema.   Lymphadenopathy:      Cervical: No cervical adenopathy.   Skin:     Coloration: Skin is not jaundiced.      Findings: No bruising, erythema or rash.   Neurological:      General: No focal deficit present.      Mental Status: She is alert.   Psychiatric:         Mood and Affect: Mood normal.     Assessment/plan:  Upper respiratory infection:  This is manifested by an otitis media, conjunctivitis, and tracheal bronchitis.  This is both common in atypical bacteria as well as viral illnesses.  Infection 2 weeks suggests a certain amount of chronicity and possible bacterial infection.  The presence of otitis media is often seen in atypical bacterial infection such as mycoplasma.  Plan:  Explanation of above.    Prednisone 20 mg daily for 7-10 days   Z-Vamshi  Cepacol lozenges during the day.    Mucinex DM at night.  Suggested the patient consider using saline nasal spray 4 squirts each nostril every 2-3 hours but she was not interested.  Also suggested was use of a warm water wash with Sacha's baby shampoo to use to splash in both eyes or use with a cotton ball along the eyelids.  Patient was reminded to be careful with hand watching and not touching her eye, as conjunctivitis can be contagious.

## 2022-12-07 ENCOUNTER — DOCUMENTATION ONLY (OUTPATIENT)
Dept: PRIMARY CARE CLINIC | Facility: CLINIC | Age: 32
End: 2022-12-07

## 2022-12-07 NOTE — PROGRESS NOTES
Individual Psychotherapy (LCSW/PhD)  Radha Pickettmagen,  12/7/2022    Site:  [facility]         Therapeutic Intervention: Met with patient for individual psychotherapy.    Chief complaint/reason for encounter: anxiety     Interval history and content of current session: Pt reports recent difficulty sleeping. Pt thinks that stress from work and supporting aunt has contributed to anxiety. SW and pt explored pt's values and needs related to their work. SW and pt discussed burnout and ways for pt to cope with lack of support. SW and pt reviewed areas where pt can exert control vs. Areas of non-control. SW and pt explored areas in work where pt can advocate for their role and contributions.    Pt has not fully engaged in self-care behaviors I.e. yoga, mindfulness, dietary changes. SW and pt discussed pt's level of self-efficacy and anxiety related to dietary change. Pt reports feeling [employer] is helpful. Pt feels like more is helping pt better estimated caloric intake as well as eating patterns. Pt feels like having dietitian is helpful. Pt wouldm like acces to apps that provide short guided meditation experiences. SW provided recommendations.    Treatment plan:  Target symptoms: anxiety   Why chosen therapy is appropriate versus another modality: relevant to diagnosis, evidence based practice  Outcome monitoring methods: self-report, checklist/rating scale  Therapeutic intervention type: supportive psychotherapy    Risk parameters:  Patient reports no suicidal ideation  Patient reports no homicidal ideation  Patient reports no self-injurious behavior  Patient reports no violent behavior    Verbal deficits: None    Patient's response to intervention:  The patient's response to intervention is accepting.    Progress toward goals and other mental status changes:  The patient's progress toward goals is fair .    Diagnosis:   No diagnosis found.    Plan: Pt plans to continue individual psychotherapy    Return to  clinic: 2 weeks    Length of Service (minutes): 45

## 2022-12-17 ENCOUNTER — PATIENT MESSAGE (OUTPATIENT)
Dept: PRIMARY CARE CLINIC | Facility: CLINIC | Age: 32
End: 2022-12-17

## 2022-12-28 ENCOUNTER — TELEPHONE (OUTPATIENT)
Dept: PRIMARY CARE CLINIC | Facility: CLINIC | Age: 32
End: 2022-12-28

## 2022-12-29 NOTE — TELEPHONE ENCOUNTER
Called pt to inform her of medical charges pt stated all her blood work was covered but one that was ordered and the labs were drawn at on visit   
Spoke to pt she stated she received a bill from [facility] for a lab and a visit with Dr. Soria, please advise    
IV discontinued, cath removed intact

## 2023-01-10 ENCOUNTER — PROCEDURE VISIT (OUTPATIENT)
Dept: OBSTETRICS AND GYNECOLOGY | Facility: CLINIC | Age: 33
End: 2023-01-10
Attending: OBSTETRICS & GYNECOLOGY
Payer: COMMERCIAL

## 2023-01-10 VITALS
WEIGHT: 235 LBS | BODY MASS INDEX: 43.24 KG/M2 | SYSTOLIC BLOOD PRESSURE: 124 MMHG | HEIGHT: 62 IN | DIASTOLIC BLOOD PRESSURE: 91 MMHG

## 2023-01-10 DIAGNOSIS — R87.610 ASCUS WITH POSITIVE HIGH RISK HPV CERVICAL: Primary | ICD-10-CM

## 2023-01-10 DIAGNOSIS — R87.810 ASCUS WITH POSITIVE HIGH RISK HPV CERVICAL: Primary | ICD-10-CM

## 2023-01-10 PROCEDURE — 88305 TISSUE EXAM BY PATHOLOGIST: CPT | Performed by: PATHOLOGY

## 2023-01-10 PROCEDURE — 88342 IMHCHEM/IMCYTCHM 1ST ANTB: CPT | Mod: 26,,, | Performed by: PATHOLOGY

## 2023-01-10 PROCEDURE — 57454 BX/CURETT OF CERVIX W/SCOPE: CPT | Mod: S$GLB,,, | Performed by: OBSTETRICS & GYNECOLOGY

## 2023-01-10 PROCEDURE — 88305 TISSUE EXAM BY PATHOLOGIST: CPT | Mod: 26,,, | Performed by: PATHOLOGY

## 2023-01-10 PROCEDURE — 88305 TISSUE EXAM BY PATHOLOGIST: ICD-10-PCS | Mod: 26,,, | Performed by: PATHOLOGY

## 2023-01-10 PROCEDURE — 57454 COLPOSCOPY W/BIOPSY AND ECC: ICD-10-PCS | Mod: S$GLB,,, | Performed by: OBSTETRICS & GYNECOLOGY

## 2023-01-10 PROCEDURE — 88342 CHG IMMUNOCYTOCHEMISTRY: ICD-10-PCS | Mod: 26,,, | Performed by: PATHOLOGY

## 2023-01-10 PROCEDURE — 88342 IMHCHEM/IMCYTCHM 1ST ANTB: CPT | Performed by: PATHOLOGY

## 2023-01-10 NOTE — PROCEDURES
Colposcopy W/BIOPSY AND ECC    Date/Time: 1/10/2023 8:30 AM  Performed by: Delia Valdez MD  Authorized by: Delia Valdez MD     Consent Done?:  Yes (Written)  Timeout:Immediately prior to procedure a time out was called to verify the correct patient, procedure, equipment, support staff and site/side marked as required  Prep:Patient was prepped and draped in the usual sterile fashion  Assistants?: No      Colposcopy Site:  Cervix  Position:  Supine  Acrowhite Lesion? Yes    Atypical Vessels: No    Transformation Zone Adequate?: Yes    Biopsy?: Yes         Location:  Cervix ((7 00))  ECC Performed?: Yes    LEEP Performed?: No    Estimated blood loss (cc):  2   Patient tolerated the procedure well with no immediate complications.   Post-operative instructions were provided for the patient.   Patient was discharged and will follow up if any complications occur

## 2023-01-18 ENCOUNTER — DOCUMENTATION ONLY (OUTPATIENT)
Dept: PRIMARY CARE CLINIC | Facility: CLINIC | Age: 33
End: 2023-01-18
Payer: COMMERCIAL

## 2023-01-18 ENCOUNTER — PATIENT MESSAGE (OUTPATIENT)
Dept: PRIMARY CARE CLINIC | Facility: CLINIC | Age: 33
End: 2023-01-18
Payer: COMMERCIAL

## 2023-01-18 LAB
FINAL PATHOLOGIC DIAGNOSIS: NORMAL
GROSS: NORMAL
Lab: NORMAL

## 2023-01-18 NOTE — PROGRESS NOTES
Individual Psychotherapy (LCSW/PhD)  Radha Pickettmagen,  1/18/2023    Site:  [facility]         Therapeutic Intervention: Met with patient for individual psychotherapy.    Chief complaint/reason for encounter: anxiety, behavior     Interval history and content of current session: Pt reports some recent difficulty sleeping. Pt has been following asleep on couch before bed which they say interferes with their ability to sleep. Pt continues to support heir aunt who has a mental illness and behavioral issues. Pt reports that this does cause stress but that they are handling their self well. Pt reports increased boundaries with aunt I.e. filtering phone calls    Pt continues to work with zee Godfrey (psychiatry) for an interdiction to help provide aunt support.SW supported pt in processing feelings of frustration in having to support aunt. SW and pt discussed importance of self-care and ways to incorporate coping skills. SW and pt also discussed pt's triggers for anxiety and ways of anticipating triggers and preparing ahead of time. SW also reviewed CBT thought journal and encouraged pt to review for increased coping skills and identifying helpful thinking vs. Catastrophic thinking. SW and pt discussed connection between mental health and physical health and importance of increasing self-care as well as working with SW to reduce to increase coping skills.    Treatment plan:  Target symptoms: anxiety   Why chosen therapy is appropriate versus another modality: relevant to diagnosis, evidence based practice  Outcome monitoring methods: self-report, checklist/rating scale  Therapeutic intervention type: behavior modifying psychotherapy, supportive psychotherapy    Risk parameters:  Patient reports no suicidal ideation  Patient reports no homicidal ideation  Patient reports no self-injurious behavior  Patient reports no violent behavior    Verbal deficits: None    Patient's response to intervention:  The  patient's response to intervention is accepting.    Progress toward goals and other mental status changes:  The patient's progress toward goals is good.    Diagnosis:   No diagnosis found.    Plan: Pt plans to continue individual psychotherapy    Return to clinic: 2 weeks    Length of Service (minutes): 60

## 2023-01-19 ENCOUNTER — TELEPHONE (OUTPATIENT)
Dept: PRIMARY CARE CLINIC | Facility: CLINIC | Age: 33
End: 2023-01-19

## 2023-01-19 NOTE — TELEPHONE ENCOUNTER
----- Message from Sandip Bear MD sent at 1/19/2023  8:58 AM CST -----  Regarding: RTC  Radha was due back this month for F/U.  See if there is any chance she can come tomorrow morning.

## 2023-01-20 ENCOUNTER — NUTRITION (OUTPATIENT)
Dept: NUTRITION | Facility: CLINIC | Age: 33
End: 2023-01-20
Payer: COMMERCIAL

## 2023-01-20 ENCOUNTER — OFFICE VISIT (OUTPATIENT)
Dept: PRIMARY CARE CLINIC | Facility: CLINIC | Age: 33
End: 2023-01-20
Attending: INTERNAL MEDICINE
Payer: COMMERCIAL

## 2023-01-20 VITALS
DIASTOLIC BLOOD PRESSURE: 88 MMHG | BODY MASS INDEX: 43.08 KG/M2 | WEIGHT: 234.13 LBS | OXYGEN SATURATION: 99 % | SYSTOLIC BLOOD PRESSURE: 126 MMHG | HEART RATE: 100 BPM | TEMPERATURE: 99 F | HEIGHT: 62 IN

## 2023-01-20 DIAGNOSIS — R06.83 SNORING: ICD-10-CM

## 2023-01-20 DIAGNOSIS — E66.01 CLASS 3 SEVERE OBESITY DUE TO EXCESS CALORIES WITHOUT SERIOUS COMORBIDITY WITH BODY MASS INDEX (BMI) OF 40.0 TO 44.9 IN ADULT: ICD-10-CM

## 2023-01-20 DIAGNOSIS — E66.01 CLASS 3 SEVERE OBESITY DUE TO EXCESS CALORIES WITHOUT SERIOUS COMORBIDITY WITH BODY MASS INDEX (BMI) OF 40.0 TO 44.9 IN ADULT: Primary | ICD-10-CM

## 2023-01-20 DIAGNOSIS — F41.1 GENERALIZED ANXIETY DISORDER: ICD-10-CM

## 2023-01-20 DIAGNOSIS — K21.9 GASTROESOPHAGEAL REFLUX DISEASE WITHOUT ESOPHAGITIS: ICD-10-CM

## 2023-01-20 PROCEDURE — 3074F PR MOST RECENT SYSTOLIC BLOOD PRESSURE < 130 MM HG: ICD-10-PCS | Mod: CPTII,S$GLB,, | Performed by: INTERNAL MEDICINE

## 2023-01-20 PROCEDURE — 3074F SYST BP LT 130 MM HG: CPT | Mod: CPTII,S$GLB,, | Performed by: INTERNAL MEDICINE

## 2023-01-20 PROCEDURE — 1159F MED LIST DOCD IN RCRD: CPT | Mod: CPTII,S$GLB,, | Performed by: INTERNAL MEDICINE

## 2023-01-20 PROCEDURE — 3008F BODY MASS INDEX DOCD: CPT | Mod: CPTII,S$GLB,, | Performed by: INTERNAL MEDICINE

## 2023-01-20 PROCEDURE — 99999 PR PBB SHADOW E&M-EST. PATIENT-LVL III: CPT | Mod: PBBFAC,,, | Performed by: INTERNAL MEDICINE

## 2023-01-20 PROCEDURE — 99499 NO LOS: ICD-10-PCS | Mod: S$GLB,,, | Performed by: INTERNAL MEDICINE

## 2023-01-20 PROCEDURE — 97803 PR MED NUTR THER, SUBSQ, INDIV, EA 15 MIN: ICD-10-PCS | Mod: S$GLB,,, | Performed by: DIETITIAN, REGISTERED

## 2023-01-20 PROCEDURE — 1160F PR REVIEW ALL MEDS BY PRESCRIBER/CLIN PHARMACIST DOCUMENTED: ICD-10-PCS | Mod: CPTII,S$GLB,, | Performed by: INTERNAL MEDICINE

## 2023-01-20 PROCEDURE — 3008F PR BODY MASS INDEX (BMI) DOCUMENTED: ICD-10-PCS | Mod: CPTII,S$GLB,, | Performed by: INTERNAL MEDICINE

## 2023-01-20 PROCEDURE — 99499 UNLISTED E&M SERVICE: CPT | Mod: S$GLB,,, | Performed by: INTERNAL MEDICINE

## 2023-01-20 PROCEDURE — 99999 PR PBB SHADOW E&M-EST. PATIENT-LVL III: ICD-10-PCS | Mod: PBBFAC,,, | Performed by: INTERNAL MEDICINE

## 2023-01-20 PROCEDURE — 97803 MED NUTRITION INDIV SUBSEQ: CPT | Mod: S$GLB,,, | Performed by: DIETITIAN, REGISTERED

## 2023-01-20 PROCEDURE — 3079F PR MOST RECENT DIASTOLIC BLOOD PRESSURE 80-89 MM HG: ICD-10-PCS | Mod: CPTII,S$GLB,, | Performed by: INTERNAL MEDICINE

## 2023-01-20 PROCEDURE — 99999 PR PBB SHADOW E&M-EST. PATIENT-LVL I: ICD-10-PCS | Mod: PBBFAC,,, | Performed by: DIETITIAN, REGISTERED

## 2023-01-20 PROCEDURE — 3079F DIAST BP 80-89 MM HG: CPT | Mod: CPTII,S$GLB,, | Performed by: INTERNAL MEDICINE

## 2023-01-20 PROCEDURE — 99999 PR PBB SHADOW E&M-EST. PATIENT-LVL I: CPT | Mod: PBBFAC,,, | Performed by: DIETITIAN, REGISTERED

## 2023-01-20 PROCEDURE — 1160F RVW MEDS BY RX/DR IN RCRD: CPT | Mod: CPTII,S$GLB,, | Performed by: INTERNAL MEDICINE

## 2023-01-20 PROCEDURE — 1159F PR MEDICATION LIST DOCUMENTED IN MEDICAL RECORD: ICD-10-PCS | Mod: CPTII,S$GLB,, | Performed by: INTERNAL MEDICINE

## 2023-01-20 NOTE — ASSESSMENT & PLAN NOTE
Anxiety continues to improve with CBT alone  She will discuss CBT-I with LCSW about some of her sleep issues

## 2023-01-20 NOTE — ASSESSMENT & PLAN NOTE
She completed a 6-week course of omeprazole  Heartburn symptoms have improved dramatically  Still experiences rare symptoms, especially when skipping breakfast and overeating later in the day

## 2023-01-20 NOTE — ASSESSMENT & PLAN NOTE
With unrefreshing sleep and early evening somnolence  (had 1 or 2 episodes in the past year with daytime somnolence)  Needs home sleep study

## 2023-01-20 NOTE — PROGRESS NOTES
Nutrition Assessment  Session Time:  60 minutes      Client name:  Radha Kee  :  1990  Age:  32 y.o.  Gender:  female    Client states she is here for her third follow-up nutrition counseling visit with me for weight management. Ms. Kee shares today she has been averaging 4K to 4500 steps daily,  continues limiting liquid calories (although did have more champagne than before over the holiday break), and continue morning caffeine intake. She does admit however that over the holiday break she did have more snacks in the evening, some of them being frozen fruit. She continues to have her dinner as her largest calorie intake, but is trying to find better choices when buying prepared foods. PMH includes MITESH, chronic migraines, GERD, obesity BMI >40. Today we are reviewing anthropometrics, and setting new nutrition and fitness goals.    Anthropometrics  Height:  62 inches     Weight:  234.2 pounds  BMI:  42.8  % Body Fat:  N/A  Waist Circumference: 43 inches    Clinical Signs/Symptoms  N/V/D:  none. GERD symptoms only return if over-eating  Appetite:  good       Past Medical History:   Diagnosis Date    Abnormal Pap smear of cervix 2019    + HR HPV    Allergy     History of acne     Migraine headache     without aura       Past Surgical History:   Procedure Laterality Date    BUNIONECTOMY  2005    bilateral        Medications    has a current medication list which includes the following prescription(s): ergotamine-caffeine 1-100 mg and omeprazole.    Vitamins, Minerals, and/or Supplements:  none. Vit D inconsistently       Food/Medication Interactions:  Reviewed     Food Allergies or Intolerances:  NKFA     Social History    Marital status:  Single  Employment:  [employer]    Social History     Tobacco Use    Smoking status: Never    Smokeless tobacco: Never   Substance Use Topics    Alcohol use: Yes     Alcohol/week: 1.0 standard drink     Types: 1 Glasses of wine per week     Comment: social         Lab Reports   Sodium   Date Value Ref Range Status   09/06/2022 136 136 - 145 mmol/L Final     Potassium   Date Value Ref Range Status   09/06/2022 3.9 3.5 - 5.1 mmol/L Final     Chloride   Date Value Ref Range Status   09/06/2022 105 95 - 110 mmol/L Final     CO2   Date Value Ref Range Status   09/06/2022 23 23 - 29 mmol/L Final     Glucose   Date Value Ref Range Status   09/06/2022 96 70 - 110 mg/dL Final     BUN   Date Value Ref Range Status   09/06/2022 8 6 - 20 mg/dL Final     Creatinine   Date Value Ref Range Status   09/06/2022 0.8 0.5 - 1.4 mg/dL Final     Calcium   Date Value Ref Range Status   09/06/2022 9.3 8.7 - 10.5 mg/dL Final     Total Protein   Date Value Ref Range Status   09/06/2022 8.1 6.0 - 8.4 g/dL Final     Albumin   Date Value Ref Range Status   09/06/2022 4.0 3.5 - 5.2 g/dL Final     Total Bilirubin   Date Value Ref Range Status   09/06/2022 0.7 0.1 - 1.0 mg/dL Final     Comment:     For infants and newborns, interpretation of results should be based  on gestational age, weight and in agreement with clinical  observations.    Premature Infant recommended reference ranges:  Up to 24 hours.............<8.0 mg/dL  Up to 48 hours............<12.0 mg/dL  3-5 days..................<15.0 mg/dL  6-29 days.................<15.0 mg/dL       Alkaline Phosphatase   Date Value Ref Range Status   09/06/2022 85 55 - 135 U/L Final     AST   Date Value Ref Range Status   09/06/2022 16 10 - 40 U/L Final     ALT   Date Value Ref Range Status   09/06/2022 23 10 - 44 U/L Final     Anion Gap   Date Value Ref Range Status   09/06/2022 8 8 - 16 mmol/L Final     eGFR if    Date Value Ref Range Status   12/16/2020 >60.0 >60 mL/min/1.73 m^2 Final     eGFR if non    Date Value Ref Range Status   12/16/2020 >60.0 >60 mL/min/1.73 m^2 Final     Comment:     Calculation used to obtain the estimated glomerular filtration  rate (eGFR) is the CKD-EPI equation.         Lab Results   Component  Value Date    WBC 18.56 (H) 08/27/2021    HGB 13.7 08/27/2021    HCT 41 08/27/2021    MCV 88 08/27/2021     08/27/2021        Lab Results   Component Value Date    CHOL 154 09/06/2022     Lab Results   Component Value Date    HDL 40 09/06/2022     Lab Results   Component Value Date    LDLCALC 105.0 09/06/2022     Lab Results   Component Value Date    TRIG 45 09/06/2022     Lab Results   Component Value Date    CHOLHDL 26.0 09/06/2022     Lab Results   Component Value Date    HGBA1C 5.1 09/06/2022     BP Readings from Last 1 Encounters:   01/10/23 (!) 124/91       Food History  Breakfast:  10am - 11am: [employer] vanilla Latte (250 calories) + vanilla scone small (120 calories. 4.5g fat, 18g carbs, 2g protein)  Mid-morning Snack: none  Lunch:  1-2pm: [employer] (donovan salad w nuggets + regular ranch dressing)  Mid-afternoon Snack:  none / snack cart ([employer] / Sour Cream and Onion Cheese Darrell)  Dinner:  [employer] / [place] / [employer] / Grilled Chicken w Lapel sprouts / hummus (Lv's) / lettuce wrap + water / unsweet tea  Snack:  frozen fruit / bag of chips (salty ruffles, crunch)  *Fluid intake:  Latte, diet coke, unsweet tea, champagne 2x a week (1 bottle a week).     Exercise History:  Walking 4K steps daily, currently new goal 5K steps daily.     Cultural/Spiritual/Personal Preferences:  None identified    Support System:  friends, parents    State of Change:  Preparation    Barriers to Change:  none    Diagnosis    Improving:  Obesity related to excessive energy intake and physical inactivity as evidenced by BMI 42.8, waist circumference 43 inches.    Intervention    RMR (Method:  [place] [address] [place]isha):  1766 kcal  Activity Factor:   1.2     CAT:  2100 kcal - 500 (wt loss) = 1600 kcal    Protein Needs: 1.2g/kg BW = 127g / day    Goals:  1.  Increase activity to 5k steps daily. (Co-workers have step challenges).  2.  Prioritize protein grams over fat grams at all meals  3.  Include water before meals and  snacks    Nutrition Education  The following education was provided to the patient:  Discussed meal planning/[employer]'s My Plate design.  Discussed healthy snacking.   Discussed weight management.  Suggested dietary modifications based on current dietary behaviors and individual food preferences.  Discussed macronutrient distribution among meals and snacks, including CHO, protein, and fat recommendations and its importance/benefits.  Discussed physical activity guidelines and its associated benefits.  Discussed tips for eating healthy when dining out.  Discussed alcohol consumption (potential health consequences of excessive alcohol intake, recommended intake for men and women, recommended serving sizes, ways to reduce and/or moderate intake, etc.).  Discussed recommended servings of non-starchy vegetables/day and food sources of such.  Discussed recommended fiber intake and food sources of such.  Discussed benefits of adequate hydration and recommended fluid intake.  Discussed importance of small behavior/habit changes in improving long-term adherence and sustainability.  Discussed goal setting.  Provided ongoing support, encouragement, and guidance toward improved health efforts.  Patient verbalized understanding of nutrition education and recommendations received.    Handouts Provided  Meal Planning Guide    Monitoring/Evaluation    Monitor the following:  Weight  BMI  % Body Fat  Caloric intake  Labs:  Vit D    Follow Up Plan:  Will communicate with referring healthcare provider for monitoring and follow-up.  Next Appointment: In four weeks.

## 2023-01-20 NOTE — PATIENT INSTRUCTIONS
Keeping a food and activity log    Successfully losing significant weight and keeping it off is one of the hardest things that a person can do.  Difficulty losing weight is complex and influenced by multiple variables including genetic, environmental, social, cultural, metabolic, and behavioral factors.  Our goal will be to understand these factors as they relate to you and your health improvement goals as deeply and completely as possible.      To that end it will be very important for us to understand 2 of the most important variables that can impact your success in achieving any weight loss goals:  the number of calories you take in daily through food and drink versus the number of calories expended daily through activity.  It will be incredibly helpful for us to know exactly what you are eating/drinking, how much you are eating/drinking, and how often for each.  In this case both the types and the amounts of calories matter.       You could keep a written food log of this information where you write down everything you eat or drink, as well as how often and how much.  But we find that it may be even easier for you to use your phone to take a photo of everything you or drink.  There is an more known as [employer] (available from the [employer] store) that enables you take photos of everything you eat or drink, and it will automatically record the time of the photo.  Another more is My Fitness Pal, which can sync to your smart watch or other wearable device (and enable you to keep track of how active you have been).  Your health  or dietitian can work with you to help you learn how to use these apps to help provide as much information as possible for our team to help you achieve your health improvement goals.             [employer] more  My Fitness Pal more

## 2023-01-20 NOTE — ASSESSMENT & PLAN NOTE
Still not on pharmacotherapy  Was able to avoid weight gain throughout the holidays  Still making some dietary adjustments  Not exercising regularly, but she does have a step goal of 5000 per day  I am worried she may have MACARENA which is causing fatigue and impeding some of her progress  Will try to reverse her current cycle by further investigating and treating if necessary

## 2023-01-20 NOTE — PROGRESS NOTES
"Intensive Primary Care Provider Appointment  Shared Note: Ivy David & Sandip DILLON)    Subjective:      Patient ID: Radha Kee is a 32 y.o. female.    Chief Complaint: Follow-up      HPI:    HPI  Here for follow-up.  Feels ok, except maybe a little more tired lately. Denies feeling down or depressed, but does report some difficulty sleeping through the night.  Typically falling asleep after she gets home from work and falling asleep on the couch.  Then waking up at 3 in the AM and then having difficulty falling asleep from there.      She does report snoring and reports unrefreshing sleep.  No AM headaches.  No witnessed apnea.      Her anxiety continues to improve        Review of Systems  Review of Systems   Constitutional:  Positive for malaise/fatigue. Negative for weight loss.   Respiratory:  Negative for shortness of breath.    Cardiovascular:  Negative for chest pain and palpitations.   Gastrointestinal:  Negative for heartburn.   Musculoskeletal:  Negative for joint pain.   Neurological:  Negative for headaches.       Objective:   Mental Health Screening  PHQ-9  Depression Patient Health Questionnaire (PHQ-9)  Over the last two weeks how often have you been bothered by little interest or pleasure in doing things: Not at all  Over the last two weeks how often have you been bothered by feeling down, depressed or hopeless: Not at all    MITESH-7       Vital Signs  /88 (BP Location: Right arm, Patient Position: Sitting, BP Method: Large (Manual))   Pulse 100   Temp 98.5 °F (36.9 °C) (Oral)   Ht 5' 2" (1.575 m)   Wt 106.2 kg (234 lb 2.1 oz)   LMP 01/02/2023   SpO2 99%   BMI 42.82 kg/m²   Physical Exam    Physical Exam  Vitals and nursing note reviewed.   Constitutional:       Appearance: Normal appearance.   HENT:      Head: Normocephalic.   Eyes:      Conjunctiva/sclera: Conjunctivae normal.   Cardiovascular:      Rate and Rhythm: Normal rate and regular rhythm.   Pulmonary:      " Effort: Pulmonary effort is normal.      Breath sounds: Normal breath sounds.   Skin:     General: Skin is warm and dry.   Neurological:      Mental Status: She is alert and oriented to person, place, and time. Mental status is at baseline.   Psychiatric:         Mood and Affect: Mood normal.         Behavior: Behavior normal.         Thought Content: Thought content normal.         Assessment:   32 y.o. female here for primary care visit       Plan:   Snoring  With unrefreshing sleep and early evening somnolence  (had 1 or 2 episodes in the past year with daytime somnolence)  Needs home sleep study    Class 3 severe obesity due to excess calories without serious comorbidity with body mass index (BMI) of 40.0 to 44.9 in adult  Still not on pharmacotherapy  Was able to avoid weight gain throughout the holidays  Still making some dietary adjustments  Not exercising regularly, but she does have a step goal of 5000 per day  I am worried she may have MACARENA which is causing fatigue and impeding some of her progress  Will try to reverse her current cycle by further investigating and treating if necessary    Gastroesophageal reflux disease without esophagitis  She completed a 6-week course of omeprazole  Heartburn symptoms have improved dramatically  Still experiences rare symptoms, especially when skipping breakfast and overeating later in the day    Generalized anxiety disorder  Anxiety continues to improve with CBT alone  She will discuss CBT-I with LCSW about some of her sleep issues       Follow up in about 3 months (around 4/20/2023).    Orders Placed This Encounter   Procedures    Home Sleep Study     Standing Status:   Future     Standing Expiration Date:   1/20/2024       Health Maintenance  Health Maintenance         Date Due Completion Date    TETANUS VACCINE Never done ---    COVID-19 Vaccine (3 - Booster for Pfizer series) 10/09/2021 8/14/2021    Cervical Cancer Screening 10/26/2027 10/26/2022            Ivy  ROSA David MD/[facility]  Internal Medicine  [facility]  [phone]

## 2023-01-25 ENCOUNTER — TELEPHONE (OUTPATIENT)
Dept: SLEEP MEDICINE | Facility: OTHER | Age: 33
End: 2023-01-25
Payer: COMMERCIAL

## 2023-01-31 ENCOUNTER — TELEPHONE (OUTPATIENT)
Dept: SLEEP MEDICINE | Facility: OTHER | Age: 33
End: 2023-01-31
Payer: COMMERCIAL

## 2023-02-07 ENCOUNTER — CLINICAL SUPPORT (OUTPATIENT)
Dept: OTHER | Facility: CLINIC | Age: 33
End: 2023-02-07

## 2023-02-07 DIAGNOSIS — Z00.8 ENCOUNTER FOR OTHER GENERAL EXAMINATION: ICD-10-CM

## 2023-02-08 ENCOUNTER — DOCUMENTATION ONLY (OUTPATIENT)
Dept: PRIMARY CARE CLINIC | Facility: CLINIC | Age: 33
End: 2023-02-08
Payer: COMMERCIAL

## 2023-02-08 VITALS
BODY MASS INDEX: 42.88 KG/M2 | HEIGHT: 62 IN | SYSTOLIC BLOOD PRESSURE: 128 MMHG | WEIGHT: 233 LBS | DIASTOLIC BLOOD PRESSURE: 84 MMHG

## 2023-02-08 LAB
GLUCOSE SERPL-MCNC: 116 MG/DL (ref 60–140)
HDLC SERPL-MCNC: 36 MG/DL
POC CHOLESTEROL, LDL (DOCK): 86 MG/DL
POC CHOLESTEROL, TOTAL: 139 MG/DL
TRIGL SERPL-MCNC: 87 MG/DL

## 2023-02-08 NOTE — PROGRESS NOTES
"Individual Psychotherapy (LCSW/PhD)  Radha Pickettmagen,  2/8/2023    Site:  [facility]         Therapeutic Intervention: Met with patient for individual psychotherapy.    Chief complaint/reason for encounter: anxiety and behavior     Interval history and content of current session: Pt has been experiencing increased work stress and feels unsupported at work. Pt feels like they have been coping "ok" with stress. Pt has been supporting sister who is going going through divorce. Pt feels like interacting with sister is "emotionally draining". SW and pt discussed boundaries with sister and impact on their self-care. Pt states they feel like they are able to cope with emotional burden of supporting.     Pt has not begun self-care routine but has found mindfulness apps they are interested in and want to start. Pt is meeting with dietitian and feels it is helping. Pt feels like it is difficult to gain momentum in behavior change because their days are "so busy". SW and pt discussed pt's career and personal goals. SW and pt discussed pt's upcoming sleep study and possible sleep apnea impact on mood and health. SW and pt discussed pt's success in refraining from alcohol use for health benefits.    Treatment plan:  Target symptoms: anxiety , work stress  Why chosen therapy is appropriate versus another modality: relevant to diagnosis, evidence based practice  Outcome monitoring methods: self-report, checklist/rating scale  Therapeutic intervention type: supportive psychotherapy    Risk parameters:  Patient reports no suicidal ideation  Patient reports no homicidal ideation  Patient reports no self-injurious behavior  Patient reports no violent behavior    Verbal deficits: None    Patient's response to intervention:  The patient's response to intervention is accepting.    Progress toward goals and other mental status changes:  The patient's progress toward goals is good.    Diagnosis:   No diagnosis found.    Plan: " Pt plans to continue individual psychotherapy    Return to clinic: 2 weeks    Length of Service (minutes): 60

## 2023-02-15 ENCOUNTER — PATIENT MESSAGE (OUTPATIENT)
Dept: SLEEP MEDICINE | Facility: OTHER | Age: 33
End: 2023-02-15
Payer: COMMERCIAL

## 2023-02-16 ENCOUNTER — TELEPHONE (OUTPATIENT)
Dept: SLEEP MEDICINE | Facility: OTHER | Age: 33
End: 2023-02-16
Payer: COMMERCIAL

## 2023-02-16 ENCOUNTER — NUTRITION (OUTPATIENT)
Dept: NUTRITION | Facility: CLINIC | Age: 33
End: 2023-02-16
Payer: COMMERCIAL

## 2023-02-16 ENCOUNTER — PATIENT MESSAGE (OUTPATIENT)
Dept: PRIMARY CARE CLINIC | Facility: CLINIC | Age: 33
End: 2023-02-16
Payer: COMMERCIAL

## 2023-02-16 DIAGNOSIS — E66.01 CLASS 3 SEVERE OBESITY DUE TO EXCESS CALORIES WITHOUT SERIOUS COMORBIDITY WITH BODY MASS INDEX (BMI) OF 40.0 TO 44.9 IN ADULT: Primary | ICD-10-CM

## 2023-02-16 DIAGNOSIS — K21.9 GASTROESOPHAGEAL REFLUX DISEASE WITHOUT ESOPHAGITIS: ICD-10-CM

## 2023-02-16 PROCEDURE — 97803 MED NUTRITION INDIV SUBSEQ: CPT | Mod: S$GLB,,, | Performed by: DIETITIAN, REGISTERED

## 2023-02-16 PROCEDURE — 99999 PR PBB SHADOW E&M-EST. PATIENT-LVL I: CPT | Mod: PBBFAC,,, | Performed by: DIETITIAN, REGISTERED

## 2023-02-16 PROCEDURE — 97803 PR MED NUTR THER, SUBSQ, INDIV, EA 15 MIN: ICD-10-PCS | Mod: S$GLB,,, | Performed by: DIETITIAN, REGISTERED

## 2023-02-16 PROCEDURE — 99999 PR PBB SHADOW E&M-EST. PATIENT-LVL I: ICD-10-PCS | Mod: PBBFAC,,, | Performed by: DIETITIAN, REGISTERED

## 2023-02-16 NOTE — TELEPHONE ENCOUNTER
Not able to leave a message through my chart to confirm the home sleep study for Feb 17th.  Sent  out a message through my chart to confirm.

## 2023-02-16 NOTE — PROGRESS NOTES
"Nutrition Assessment  Session Time:  60 minutes      Client name:  Radha Kee  :  1990  Age:  32 y.o.  Gender:  female    Client states she is here for her fourth follow-up nutrition counseling visit with me for weight management. Ms. Kee shares today she has not been consistent with dietary changes as she sees healthy eating as an all-or-nothing commitment. 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 As a treat and stress reliever she indulges in fried foods and states that she has been having trouble finding foods that have more protein than fat. She is well-versed in nutrition. PMH includes MITESH, chronic migraines, GERD, obesity BMI>40. She shares she does not have a goal to be thin, but rather feels she would like to not be winded so early when doing physical activities, and "maybe lose a little bit of weight".    Anthropometrics  Height:  62 inches     Weight:   234.6 pounds  BMI:  42.9  % Body Fat:  N/A  Waist Circumference: 44 inches     Clinical Signs/Symptoms  N/V/D:  none  Appetite:  good       Past Medical History:   Diagnosis Date    Abnormal Pap smear of cervix 2019    + HR HPV    Allergy     History of acne     Migraine headache     without aura       Past Surgical History:   Procedure Laterality Date    BUNIONECTOMY  2005    bilateral        Medications    has a current medication list which includes the following prescription(s): ergotamine-caffeine 1-100 mg and omeprazole.    Vitamins, Minerals, and/or Supplements:  none     Food/Medication Interactions:  Reviewed     Food Allergies or Intolerances:  NKFA     Social History    Marital status:  Single  Employment:  [employer]    Social History     Tobacco Use    Smoking status: Never    Smokeless tobacco: Never   Substance Use Topics    Alcohol use: Yes     Alcohol/week: 1.0 standard drink     Types: 1 Glasses of wine per week     Comment: social        Lab Reports   Sodium "   Date Value Ref Range Status   09/06/2022 136 136 - 145 mmol/L Final     Potassium   Date Value Ref Range Status   09/06/2022 3.9 3.5 - 5.1 mmol/L Final     Chloride   Date Value Ref Range Status   09/06/2022 105 95 - 110 mmol/L Final     CO2   Date Value Ref Range Status   09/06/2022 23 23 - 29 mmol/L Final     Glucose   Date Value Ref Range Status   09/06/2022 96 70 - 110 mg/dL Final     BUN   Date Value Ref Range Status   09/06/2022 8 6 - 20 mg/dL Final     Creatinine   Date Value Ref Range Status   09/06/2022 0.8 0.5 - 1.4 mg/dL Final     Calcium   Date Value Ref Range Status   09/06/2022 9.3 8.7 - 10.5 mg/dL Final     Total Protein   Date Value Ref Range Status   09/06/2022 8.1 6.0 - 8.4 g/dL Final     Albumin   Date Value Ref Range Status   09/06/2022 4.0 3.5 - 5.2 g/dL Final     Total Bilirubin   Date Value Ref Range Status   09/06/2022 0.7 0.1 - 1.0 mg/dL Final     Comment:     For infants and newborns, interpretation of results should be based  on gestational age, weight and in agreement with clinical  observations.    Premature Infant recommended reference ranges:  Up to 24 hours.............<8.0 mg/dL  Up to 48 hours............<12.0 mg/dL  3-5 days..................<15.0 mg/dL  6-29 days.................<15.0 mg/dL       Alkaline Phosphatase   Date Value Ref Range Status   09/06/2022 85 55 - 135 U/L Final     AST   Date Value Ref Range Status   09/06/2022 16 10 - 40 U/L Final     ALT   Date Value Ref Range Status   09/06/2022 23 10 - 44 U/L Final     Anion Gap   Date Value Ref Range Status   09/06/2022 8 8 - 16 mmol/L Final     eGFR if    Date Value Ref Range Status   12/16/2020 >60.0 >60 mL/min/1.73 m^2 Final     eGFR if non    Date Value Ref Range Status   12/16/2020 >60.0 >60 mL/min/1.73 m^2 Final     Comment:     Calculation used to obtain the estimated glomerular filtration  rate (eGFR) is the CKD-EPI equation.         Lab Results   Component Value Date    WBC 18.56  (H) 08/27/2021    HGB 13.7 08/27/2021    HCT 41 08/27/2021    MCV 88 08/27/2021     08/27/2021        Lab Results   Component Value Date    CHOL 154 09/06/2022     Lab Results   Component Value Date    HDL 40 09/06/2022     Lab Results   Component Value Date    LDLCALC 105.0 09/06/2022     Lab Results   Component Value Date    TRIG 45 09/06/2022     Lab Results   Component Value Date    CHOLHDL 26.0 09/06/2022     Lab Results   Component Value Date    HGBA1C 5.1 09/06/2022     BP Readings from Last 1 Encounters:   02/07/23 128/84       Food History  Breakfast:  [employer] vanilla latte  Mid-morning Snack:  none  Lunch:  none/ [employer]  Mid-afternoon Snack:  chips  Dinner:  sesame chicken from [employer] [place] / [employer] / [employer] and Fried Oysters  Snack:  chips  *Fluid intake:  Latte, diet coke, unsweet tea    Exercise History:  currently averaging 4k steps daily.    Cultural/Spiritual/Personal Preferences:  None identified    Support System:  friends, parents    State of Change:  Contemplation    Barriers to Change:  none    Diagnosis    Continues:  Obesity related to excessive energy intake and physical inactivity as evidenced by BMI 42.9, waist circumference 44 inches.    Intervention    RMR (Method:  [place] [facility]):  1766 kcal  Activity Factor:   1.2     CAT:  2100 kcal - 500 (wt loss) = 1600 kcal     Protein Needs: 1.2g/kg BW = 127g / day    Goals:  1.  Aim to reach 6k steps / day consistently  2.  Continue drinking water with and in-between all meals  3.  Focus on non-starchy vegetables first when choosing and eating restaurant foods    Nutrition Education  The following education was provided to the patient:  Discussed weight management.  Suggested dietary modifications based on current dietary behaviors and individual food preferences.  Discussed importance of small behavior/habit changes in improving long-term adherence and sustainability.  Discussed goal setting.  Provided  ongoing support, encouragement, and guidance toward improved health efforts.  Patient verbalized understanding of nutrition education and recommendations received.    Handouts Provided  None at this time    Monitoring/Evaluation    Monitor the following:  Weight  BMI  % Body Fat  Caloric intake  Labs:  Vit D, HDL    Follow Up Plan:  Will communicate with referring healthcare provider for monitoring and follow-up.  Next Appointment: In four weeks.

## 2023-02-20 ENCOUNTER — TELEPHONE (OUTPATIENT)
Dept: SLEEP MEDICINE | Facility: OTHER | Age: 33
End: 2023-02-20
Payer: COMMERCIAL

## 2023-02-22 ENCOUNTER — HOSPITAL ENCOUNTER (OUTPATIENT)
Dept: SLEEP MEDICINE | Facility: OTHER | Age: 33
Discharge: HOME | End: 2023-02-22
Attending: INTERNAL MEDICINE
Payer: COMMERCIAL

## 2023-02-22 DIAGNOSIS — G47.33 OSA (OBSTRUCTIVE SLEEP APNEA): Primary | ICD-10-CM

## 2023-02-22 DIAGNOSIS — R06.83 SNORING: ICD-10-CM

## 2023-02-22 PROCEDURE — 95800 SLP STDY UNATTENDED: CPT

## 2023-02-26 ENCOUNTER — PATIENT MESSAGE (OUTPATIENT)
Dept: PRIMARY CARE CLINIC | Facility: CLINIC | Age: 33
End: 2023-02-26
Payer: COMMERCIAL

## 2023-03-01 PROBLEM — G47.33 OSA (OBSTRUCTIVE SLEEP APNEA): Status: ACTIVE | Noted: 2023-03-01

## 2023-03-01 PROCEDURE — 95806 PR SLEEP STUDY, UNATTENDED, SIMUL RECORD HR/O2 SAT/RESP FLOW/RESP EFFT: ICD-10-PCS | Mod: 26,,, | Performed by: PSYCHIATRY & NEUROLOGY

## 2023-03-01 PROCEDURE — 95806 SLEEP STUDY UNATT&RESP EFFT: CPT | Mod: 26,,, | Performed by: PSYCHIATRY & NEUROLOGY

## 2023-03-01 NOTE — PROCEDURES
"Dear Doctor Chema,    The sleep study that you ordered is complete.  You have ordered sleep LAB services to perform the sleep study for Radha Kee.     Please find Sleep Study result in  the "Media tab" of Chart Review menu.       You can look  for the report in the  Media by the document type "Sleep Study Documents". Alphabetizing  "Document type" column helps to find the SLEEP STUDY report  Faster.      As the ordering provider, you are responsible for reviewing the results and implementing a treatment plan with your patient.    If you need a Sleep Medicine provider to explain the sleep study findings and arrange treatment for the patient, please refer patient for consultation to our Sleep Clinic via [facility] with Ambulatory Consult Sleep.    To do that please place an order for an  "Ambulatory Consult Sleep" - it will go to our clinic work queue for our Medical Assistant to contact the patient for an appointment.     For any questions, please contact our clinic staff at [phone] to talk to clinical staff        Lola Merino MD      _______  PHYSICIAN INTERPRETATION AND COMMENTS: Findings are consistent with moderate, obstructive sleep apnea (MACARENA)  (G47.33), by overall AHI (apnea hypopnea index). However, findings on this study suggest that the degree of sleep  disordered breathing is in the severe range, when RDI is measured. This study was technically adequate to allow for  interpretation.  CLINICAL HISTORY: 32 year old female presented with: 17 inch neck, BMI of 42.8, an [place] sleepiness score of 11, no comorbidities  and symptoms of nocturnal snoring, waking up choking and witnessed apneas. Based on the clinical history,  the patient has a high pre-test probability of having Severe MACARENA.  SLEEP STUDY FINDINGS: Patient underwent a 1 night Home Sleep Test and by behavioral criteria, slept for approximately  5.85 hours, with a sleep latency of 2 minutes and a sleep efficiency of 83.5%. Moderate " sleep disordered breathing (AHI=18)  is noted based on a 4% hypopnea desaturation criteria, predominantly in the supine position (24 events/hour). The patient  slept supine 64.5% of the night based on valid recording time of 5.85 hours and is 2.7 times as likely to have  apneas/hypopneas when supine. When considering more subtle measures of sleep disordered breathing, the overall  respiratory disturbance index is moderate(RDI=29) based on a 1% hypopnea desaturation criteria with confirmation by  surrogate arousal indicators. The apneas/hypopneas are accompanied by minimal oxygen desaturation (percent time  below 90% SpO2: 4%, Min SpO2: 80.5%). The average desaturation across all sleep disordered breathing events is 3.4%. The  mean pulse rate is 77.6 BPM, with very frequent pulse rate variability (75 events with >= 6 BPM increase/decrease per hour).  TREATMENT CONSIDERATIONS: Consider trial of Auto-titrating CPAP 6-20 cm, mask of patient's choice, and heated  humidification. If patient has difficulty with CPAP adherence or ongoing MACARENA symptoms or despite CPAP adherence, then  consider an in-lab titration sleep study in order to determine optimal fixed CPAP setting. Alternatively consider oral  appliance fitted by a dentist specializing in these devices, or surgical consultation for uvulopalatopharyngoplasty (UPPP)  for treatment of obstructive sleep apnea.  DISEASE MANAGEMENT CONSIDERATIONS: Definitive treatment for MACARENA is recommended. Consider Sleep Clinic referral for  MACARENA management.  Signature:

## 2023-03-02 ENCOUNTER — PATIENT MESSAGE (OUTPATIENT)
Dept: PRIMARY CARE CLINIC | Facility: CLINIC | Age: 33
End: 2023-03-02
Payer: COMMERCIAL

## 2023-03-02 DIAGNOSIS — G47.33 OSA (OBSTRUCTIVE SLEEP APNEA): Primary | ICD-10-CM

## 2023-03-02 NOTE — ASSESSMENT & PLAN NOTE
Home sleep study from 3/1/23 showed moderate, obstructive sleep apnea (MACARENA) (G47.33), by overall AHI (apnea hypopnea index [AHI=18]).  However, findings on this study suggest that the degree of sleep disordered breathing is in the severe range, when RDI is measured. Respiratory disturbance index (RDI) is moderate (RDI=29) based on a 1% hypopnea desaturation criteria with confirmation by  surrogate arousal indicators.    Will prescribe autoCPAP with auto titration range of 6-15 cm H2O.

## 2023-03-07 ENCOUNTER — TELEPHONE (OUTPATIENT)
Dept: PRIMARY CARE CLINIC | Facility: CLINIC | Age: 33
End: 2023-03-07

## 2023-03-07 NOTE — TELEPHONE ENCOUNTER
Spoke to [facility] she stated the CPAP orders are in but they haven't gotten to pt case as of yet but pt is on the list also called pt to offer her appt Tuesday no answer LVM to call me and we can discuss

## 2023-03-07 NOTE — TELEPHONE ENCOUNTER
----- Message from Sandip Bear MD sent at 3/7/2023  8:48 AM CST -----  Regarding: CPAP supplies  Ivy, I know you spoke to Radha this morning.      A home sleep study showed that she has MACARENA and I ordered auto CPAP for her.  Ivon (copied on this message) faxed the order to DME last week.  How do we make sure that the order is on track because I don't think Radha has heard or received anything from anyone yet?      And she is requesting a sooner appt for a day other than a Friday.  I'm thinking that I could see her next Tuesday morning between 8 and 10 AM.  Also copying Shandra who can create an appt slot for her if that time works for her.      Please keep me posted and let me know if she can be seen by me on Tuesday AM and the status of her CPAP supplies.

## 2023-03-07 NOTE — TELEPHONE ENCOUNTER
Radha tried calling back also.  You might want to make sure both she (and Marianne) have the number of your direct line

## 2023-03-11 ENCOUNTER — PATIENT MESSAGE (OUTPATIENT)
Dept: PRIMARY CARE CLINIC | Facility: CLINIC | Age: 33
End: 2023-03-11
Payer: COMMERCIAL

## 2023-03-13 ENCOUNTER — PATIENT MESSAGE (OUTPATIENT)
Dept: PRIMARY CARE CLINIC | Facility: CLINIC | Age: 33
End: 2023-03-13
Payer: COMMERCIAL

## 2023-03-14 ENCOUNTER — OFFICE VISIT (OUTPATIENT)
Dept: PRIMARY CARE CLINIC | Facility: CLINIC | Age: 33
End: 2023-03-14
Attending: INTERNAL MEDICINE
Payer: COMMERCIAL

## 2023-03-14 VITALS
DIASTOLIC BLOOD PRESSURE: 88 MMHG | BODY MASS INDEX: 42.88 KG/M2 | HEART RATE: 88 BPM | SYSTOLIC BLOOD PRESSURE: 126 MMHG | OXYGEN SATURATION: 99 % | HEIGHT: 62 IN | WEIGHT: 233 LBS

## 2023-03-14 DIAGNOSIS — G47.33 OSA (OBSTRUCTIVE SLEEP APNEA): ICD-10-CM

## 2023-03-14 DIAGNOSIS — F41.1 GENERALIZED ANXIETY DISORDER: ICD-10-CM

## 2023-03-14 DIAGNOSIS — E66.01 CLASS 3 SEVERE OBESITY DUE TO EXCESS CALORIES WITHOUT SERIOUS COMORBIDITY WITH BODY MASS INDEX (BMI) OF 40.0 TO 44.9 IN ADULT: ICD-10-CM

## 2023-03-14 PROCEDURE — 1159F MED LIST DOCD IN RCRD: CPT | Mod: CPTII,S$GLB,, | Performed by: INTERNAL MEDICINE

## 2023-03-14 PROCEDURE — 3079F DIAST BP 80-89 MM HG: CPT | Mod: CPTII,S$GLB,, | Performed by: INTERNAL MEDICINE

## 2023-03-14 PROCEDURE — 1159F PR MEDICATION LIST DOCUMENTED IN MEDICAL RECORD: ICD-10-PCS | Mod: CPTII,S$GLB,, | Performed by: INTERNAL MEDICINE

## 2023-03-14 PROCEDURE — 99499 NO LOS: ICD-10-PCS | Mod: S$GLB,,, | Performed by: INTERNAL MEDICINE

## 2023-03-14 PROCEDURE — 3008F BODY MASS INDEX DOCD: CPT | Mod: CPTII,S$GLB,, | Performed by: INTERNAL MEDICINE

## 2023-03-14 PROCEDURE — 1160F RVW MEDS BY RX/DR IN RCRD: CPT | Mod: CPTII,S$GLB,, | Performed by: INTERNAL MEDICINE

## 2023-03-14 PROCEDURE — 99999 PR PBB SHADOW E&M-EST. PATIENT-LVL III: CPT | Mod: PBBFAC,,, | Performed by: INTERNAL MEDICINE

## 2023-03-14 PROCEDURE — 3074F PR MOST RECENT SYSTOLIC BLOOD PRESSURE < 130 MM HG: ICD-10-PCS | Mod: CPTII,S$GLB,, | Performed by: INTERNAL MEDICINE

## 2023-03-14 PROCEDURE — 99499 UNLISTED E&M SERVICE: CPT | Mod: S$GLB,,, | Performed by: INTERNAL MEDICINE

## 2023-03-14 PROCEDURE — 3074F SYST BP LT 130 MM HG: CPT | Mod: CPTII,S$GLB,, | Performed by: INTERNAL MEDICINE

## 2023-03-14 PROCEDURE — 3079F PR MOST RECENT DIASTOLIC BLOOD PRESSURE 80-89 MM HG: ICD-10-PCS | Mod: CPTII,S$GLB,, | Performed by: INTERNAL MEDICINE

## 2023-03-14 PROCEDURE — 99999 PR PBB SHADOW E&M-EST. PATIENT-LVL III: ICD-10-PCS | Mod: PBBFAC,,, | Performed by: INTERNAL MEDICINE

## 2023-03-14 PROCEDURE — 3008F PR BODY MASS INDEX (BMI) DOCUMENTED: ICD-10-PCS | Mod: CPTII,S$GLB,, | Performed by: INTERNAL MEDICINE

## 2023-03-14 PROCEDURE — 1160F PR REVIEW ALL MEDS BY PRESCRIBER/CLIN PHARMACIST DOCUMENTED: ICD-10-PCS | Mod: CPTII,S$GLB,, | Performed by: INTERNAL MEDICINE

## 2023-03-14 NOTE — PATIENT INSTRUCTIONS
PLEASE REMEMBER TO CALLL US FIRST with any medical questions or concerns. We try very hard to keep you out of the emergency room if we are able to see you and help with your concern. It is one of our many ways to keep our patients healthy.   For now the best way to reach us is by calling Ivy's direct number:  CALL OUR OFFICE AT: [phone]    In the months to come we are also hoping to be available for scheduling via the portal.  Dr. Ender Soria and KAI Payne are both here and available most days of the week if you need to be seen in person.  I am here every Friday but can also make myself available on other days if necessary as long as I have enough notice.  Please do not ever hesitate to reach to me via the patient portal.      Please let us know if you have any challenges getting scheduled with our behavioral therapist/ or dietitian or if you have any difficulty with any new medication(s) that were prescribed.     Dr. GUTIERREZ

## 2023-03-14 NOTE — ASSESSMENT & PLAN NOTE
See note for A/P from 3/2/23  Has not started auto CPAP yet -- but has met her deductible and is now waiting for financial accounting to catch up because sleep med is trying to charge >$500 for the CPAP and supplies  As soon as the billing is cleared she will begin using the auto CPAP

## 2023-03-14 NOTE — ASSESSMENT & PLAN NOTE
GAD7 score baseline was 11 back in 9/22  Went down to 7 with CBT alone, but now back up to 10 today (3/14/23)  We again discussed pharmacotherapy, but she declines for now  We discussed continuing CBT alone for now, but following her GAD7 score over time, with possible initiation of pharmacotherapy depending on progess

## 2023-03-14 NOTE — ASSESSMENT & PLAN NOTE
"She disclosed a history of binging and purging today and informs me that she has found the visits with dietitian to be triggering  She typically avoids weighing herself and when she fails to lose weight between dietitian visits, she "beats herself up" about it  We decided that she should stop seeing Reuben at this point and focus only on LCSW (Mina), mental health, self-efficacy, and healthier coping mechanisms at this point  She does have MACARENA and will begin auto CPAP soon  Avoid weighing in the future  No pharmacotherapy or further attempts at weight loss until she feels mentally ready    "

## 2023-03-14 NOTE — PROGRESS NOTES
"Intensive Primary Care Provider Appointment  Shared Note: Ivy David & Sandip DILLON)    Subjective:      Patient ID: Radha Kee is a 32 y.o. female.    Chief Complaint: Follow-up      HPI:    Follow-up  Pertinent negatives include no chest pain.  Here for follow-up.  No recent headaches, has not needed cafergot and we have not needed to begin migraine prophy.    Still struggling with anxiety, which she has suffered from since 16 when her dad .    She remains hesitant to start medications at this point, but continues to see Mina and feels like she is making some slow progress.    Her weight has gone from 109.3 kg to 105.7 kg (7 lbs since starting with us).  She tells me that she is seeing dietitian (Reuben) but finds the appointments with him "triggering" where she doesn't feel like she is making progress, and the lack of progress then results in her beating herself up.  She has a history of bingng and purging which started when she was in college.          Review of Systems  Review of Systems   Constitutional:  Positive for malaise/fatigue.   Respiratory:  Negative for shortness of breath.    Cardiovascular:  Negative for chest pain.   Gastrointestinal:  Negative for heartburn.       Objective:   Mental Health Screening  PHQ-9       MITESH-7  Generalized Anxiety Disorder 7-item (MITESH-7) Scale. HOW OFTEN DURING THE PAST 2 WEEKS HAVE YOU FELT BOTHERED BY:  1. Feeling nervous, anxious, or on edge?: More than half the days  2. Not being able to stop or control worrying?: Several days  3. Worrying too much about different things?: More than half the days  4. Trouble relaxing?: More than half the days  5. Being so restless that it is hard to sit still?: Several days  6. Becoming easily annoyed or irritable?: More than half the days  7. Feeling afraid as if something awful might happen?: Not at all  8. If you checked off any problems, how difficult have these problems made it for you to do your " "work, take care of things at home, or get along with other people?: Somewhat difficult  MITESH-7 Score: 10  Number answered (out of first 7): 7  Interpretation: Moderate Anxiety    Vital Signs  /88 (BP Location: Left arm, Patient Position: Sitting, BP Method: Large (Manual))   Pulse 88   Ht 5' 2" (1.575 m)   Wt 105.7 kg (233 lb 0.4 oz)   SpO2 99%   BMI 42.62 kg/m²   Physical Exam  Deferred today  Physical Exam      Assessment:   32 y.o. female here for primary care visit       Plan:   1. Generalized anxiety disorder  Assessment & Plan:  GAD7 score baseline was 11 back in 9/22  Went down to 7 with CBT alone, but now back up to 10 today (3/14/23)  We again discussed pharmacotherapy, but she declines for now  We discussed continuing CBT alone for now, but following her GAD7 score over time, with possible initiation of pharmacotherapy depending on progess      2. MACARENA (obstructive sleep apnea)  Assessment & Plan:  See note for A/P from 3/2/23  Has not started auto CPAP yet -- but has met her deductible and is now waiting for financial accounting to catch up because sleep med is trying to charge >$500 for the CPAP and supplies  As soon as the billing is cleared she will begin using the auto CPAP      3. Class 3 severe obesity due to excess calories without serious comorbidity with body mass index (BMI) of 40.0 to 44.9 in adult  Assessment & Plan:  She disclosed a history of binging and purging today and informs me that she has found the visits with dietitian to be triggering  She typically avoids weighing herself and when she fails to lose weight between dietitian visits, she "beats herself up" about it  We decided that she should stop seeing Reuben at this point and focus only on LCSW (Mina), mental health, self-efficacy, and healthier coping mechanisms at this point  She does have MACARENA and will begin auto CPAP soon  Avoid weighing in the future  No pharmacotherapy or further attempts at weight loss until she " feels mentally ready          Follow up in about 3 months (around 6/14/2023).    No orders of the defined types were placed in this encounter.      Health Maintenance  Health Maintenance         Date Due Completion Date    TETANUS VACCINE Never done ---    COVID-19 Vaccine (3 - Booster for Pfizer series) 10/09/2021 8/14/2021    Cervical Cancer Screening 10/26/2027 10/26/2022            ROSA Martini MD/[facility]  Internal Medicine  [facility]  [phone]

## 2023-03-22 ENCOUNTER — CLINICAL SUPPORT (OUTPATIENT)
Dept: PRIMARY CARE CLINIC | Facility: CLINIC | Age: 33
End: 2023-03-22
Payer: COMMERCIAL

## 2023-03-22 DIAGNOSIS — E66.01 CLASS 3 SEVERE OBESITY DUE TO EXCESS CALORIES WITHOUT SERIOUS COMORBIDITY WITH BODY MASS INDEX (BMI) OF 40.0 TO 44.9 IN ADULT: ICD-10-CM

## 2023-03-22 DIAGNOSIS — F41.1 GENERALIZED ANXIETY DISORDER: Primary | ICD-10-CM

## 2023-03-22 PROCEDURE — 99499 NO LOS: ICD-10-PCS | Mod: S$GLB,,, | Performed by: SOCIAL WORKER

## 2023-03-22 PROCEDURE — 99499 UNLISTED E&M SERVICE: CPT | Mod: S$GLB,,, | Performed by: SOCIAL WORKER

## 2023-03-22 NOTE — PROGRESS NOTES
"Individual Psychotherapy (LCSW/PhD)  Radha Pickettmagen,  3/22/2023    Site:  [facility]         Therapeutic Intervention: Met with patient for individual psychotherapy.    Chief complaint/reason for encounter: anxiety and behavior     Interval history and content of current session: Pt reports progress at work with adjusting workflow and feels "better" about work. Pt still expressed frustration at lack of support at work but feels like their is "hope" that things can change. Pt reports being triggered by recording meals etc. pt feels that monitoring intake was increasing anxiety.    LCSW and pt discussed Hx of negative eating behaviors and possible triggers. 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 LCSW and pt discussed cycle of binging and purging and automatic negative thoughts that perpetuate feelings of shame and guilt. Pt states they have been able to balanced consumption of soft drinks. LCSW and pt discussed pt's perspective on food vs. Drink consumption and role of food in patient's coping and self-care.    Goal:  1) use CBT to understand and identify automatic thougths    Treatment plan:  Target symptoms: anxiety   Why chosen therapy is appropriate versus another modality: relevant to diagnosis, evidence based practice  Outcome monitoring methods: self-report, checklist/rating scale  Therapeutic intervention type: supportive psychotherapy    Risk parameters:  Patient reports no suicidal ideation  Patient reports no homicidal ideation  Patient reports no self-injurious behavior  Patient reports no violent behavior    Verbal deficits: None    Patient's response to intervention:  The patient's response to intervention is accepting.    Progress toward goals and other mental status changes:  The patient's progress toward goals is fair .    Diagnosis:   No diagnosis found.    Plan: Pt plans to " continue individual psychotherapy    Return to clinic: 2 weeks    Length of Service (minutes): 60

## 2023-03-30 DIAGNOSIS — G47.33 OSA (OBSTRUCTIVE SLEEP APNEA): Primary | ICD-10-CM

## 2023-04-04 ENCOUNTER — CLINICAL SUPPORT (OUTPATIENT)
Dept: PRIMARY CARE CLINIC | Facility: CLINIC | Age: 33
End: 2023-04-04
Payer: COMMERCIAL

## 2023-04-04 DIAGNOSIS — E66.01 CLASS 3 SEVERE OBESITY DUE TO EXCESS CALORIES WITHOUT SERIOUS COMORBIDITY WITH BODY MASS INDEX (BMI) OF 40.0 TO 44.9 IN ADULT: Primary | ICD-10-CM

## 2023-04-04 PROCEDURE — 99499 UNLISTED E&M SERVICE: CPT | Mod: S$GLB,,, | Performed by: SOCIAL WORKER

## 2023-04-04 PROCEDURE — 99499 NO LOS: ICD-10-PCS | Mod: S$GLB,,, | Performed by: SOCIAL WORKER

## 2023-04-04 NOTE — PROGRESS NOTES
"Individual Psychotherapy (LCSW/PhD)  Radha Pickettmagen,  4/4/2023    Site:  [facility]         Therapeutic Intervention: Met with patient for individual psychotherapy.    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 Aunt suffers from severe mental illness which poses challenges for pt in engaging and seeking care for aunt. Aunt has refused community resources in the past despite pt's attempts.     Pt is trying coping with stress at work which they hope is temporary. PT is working towards finishing Masters of Nursing degree which they hope will provide them with more career opportunities. Pt reports increased energy and better sleep with use of CPAP. Pt and LCSW discussed possibly avoiding use of CPAP monitoring more to pt's HX of anxiety. Pt has noticed increased energy especially on waking up.    Pt would like to lose "some weight" to improve their health. LCSW and pt utilized CBT triangle to analyze perfectionist thinking and challenge unhelpful thoughts I.e. CPAP use    Goals;  1) refrain from consumption of sugar sweetened beverages  2) find spiritual oriented yoga practice   3) use CPAP nightly        Treatment plan:  Target symptoms:  behavior  Why chosen therapy is appropriate versus another modality: relevant to diagnosis, evidence based practice  Outcome monitoring methods: self-report, checklist/rating scale  Therapeutic intervention type: supportive psychotherapy    Risk parameters:  Patient reports no suicidal ideation  Patient reports no homicidal ideation  Patient reports no self-injurious behavior  Patient reports no violent behavior    Verbal deficits: None    Patient's response to intervention:  The patient's response to intervention is accepting.    Progress toward goals and other mental status changes:  The patient's progress toward goals is limited.    Diagnosis:   No " diagnosis found.    Plan: Pt plans to continue individual psychotherapy    Return to clinic: 2 weeks    Length of Service (minutes): 60

## 2023-04-05 ENCOUNTER — TELEPHONE (OUTPATIENT)
Dept: PRIMARY CARE CLINIC | Facility: CLINIC | Age: 33
End: 2023-04-05
Payer: COMMERCIAL

## 2023-04-05 NOTE — TELEPHONE ENCOUNTER
"----- Message from Sandip Bear MD sent at 3/30/2023 10:31 AM CDT -----  Regarding: nasal mask for CPAP  Hi all, I just placed an order for CPAP supplies for this patient which included a nasal mask, but when I pressed enter, the order said that it was "printed."  I have no idea where it would have printed or how to change that.  I tried to put in another order, but didn't see where I could change the destination of the order.     Ivon, I'm copying you because you helped me with this patient last time, and Ivy is helping us out in [place] today.  Any advice on how to get this order successfully placed is appreciated.     Dr. GUTIERREZ"

## 2023-05-23 ENCOUNTER — PATIENT MESSAGE (OUTPATIENT)
Dept: PRIMARY CARE CLINIC | Facility: CLINIC | Age: 33
End: 2023-05-23
Payer: COMMERCIAL

## 2023-06-01 PROBLEM — R06.83 SNORING: Status: RESOLVED | Noted: 2023-01-20 | Resolved: 2023-06-01

## 2023-06-01 NOTE — PROGRESS NOTES
"Intensive Primary Care Provider Appointment  Shared Note: [facility] David & Sandip DILLON)    Subjective:      Patient ID: Radha Kee is a 33 y.o. female.    Chief Complaint: Follow-up      HPI:    HPI  Here for follow-up visit.    Has been seeing LCSW (Mina) and has been wearing her CPAP about half of the time, which she feels has helped her with her energy level, especially upon awakening.  When she doesn't use it it is typically because she is having increased anxiety. When she uses the CPAP she doesn't wake up feeling exhausted or like she needs an afternoon nap.   Correction regarding the date of her father's death.  It was 7/21/2014    She did have one debilitating headache one week ago that was severe enough for her to take the cafergot, but she didn't take it because she admits to being "scared" of trying new medication.  She also had about 2 or 3 of the milder HA's that seem to be a second phenotype.      Has not been exercising.          Review of Systems  Review of Systems   Constitutional:  Negative for malaise/fatigue.   Respiratory:  Negative for shortness of breath.    Cardiovascular:  Negative for chest pain and palpitations.   Gastrointestinal:  Positive for heartburn (rare, took prilosec twice in the past few weeks). Negative for blood in stool and melena.   Genitourinary:  Negative for dysuria and hematuria.   Musculoskeletal:  Negative for joint pain and myalgias.   Neurological:  Positive for headaches.       Objective:   Mental Health Screening  PHQ-9       MITESH-7  Generalized Anxiety Disorder 7-item (MITESH-7) Scale. HOW OFTEN DURING THE PAST 2 WEEKS HAVE YOU FELT BOTHERED BY:  1. Feeling nervous, anxious, or on edge?: More than half the days  2. Not being able to stop or control worrying?: Several days  3. Worrying too much about different things?: Several days  4. Trouble relaxing?: Several days  5. Being so restless that it is hard to sit still?: Several days  6. Becoming easily " "annoyed or irritable?: Several days  7. Feeling afraid as if something awful might happen?: Several days  8. If you checked off any problems, how difficult have these problems made it for you to do your work, take care of things at home, or get along with other people?: Not difficult at all  MITESH-7 Score: 8  Number answered (out of first 7): 7  Interpretation: Mild Anxiety    Wt Readings from Last 6 Encounters:   03/14/23 105.7 kg (233 lb 0.4 oz)   02/07/23 105.7 kg (233 lb)   01/20/23 106.2 kg (234 lb 2.1 oz)   01/10/23 106.6 kg (235 lb)   12/06/22 106.4 kg (234 lb 9.1 oz)   10/26/22 107.7 kg (237 lb 7 oz)   ]  BP Readings from Last 4 Encounters:   06/02/23 114/84   03/14/23 126/88   02/07/23 128/84   01/20/23 126/88   ]    Vital Signs  /84 (BP Location: Left arm, Patient Position: Sitting, BP Method: Medium (Manual))   Pulse 106   Temp 97.6 °F (36.4 °C) (Oral)   Ht 5' 2" (1.575 m)   SpO2 98%   BMI 42.62 kg/m²   Physical Exam  Physical Exam  Vitals and nursing note reviewed.   Constitutional:       Appearance: Normal appearance.   HENT:      Head: Normocephalic.      Right Ear: Tympanic membrane, ear canal and external ear normal.      Left Ear: Tympanic membrane, ear canal and external ear normal.   Eyes:      Conjunctiva/sclera: Conjunctivae normal.   Cardiovascular:      Rate and Rhythm: Normal rate and regular rhythm.      Heart sounds: No murmur heard.  Pulmonary:      Effort: Pulmonary effort is normal.      Breath sounds: Normal breath sounds.   Musculoskeletal:      Cervical back: Neck supple.   Skin:     General: Skin is warm and dry.   Neurological:      Mental Status: She is alert and oriented to person, place, and time. Mental status is at baseline.   Psychiatric:         Mood and Affect: Mood normal.         Behavior: Behavior normal.         Thought Content: Thought content normal.         Assessment:   33 y.o. female here for primary care visit       Plan:   1. Class 3 severe obesity due to " excess calories without serious comorbidity with body mass index (BMI) of 40.0 to 44.9 in adult  Assessment & Plan:  See prior note  Avoiding visits with dietitian currently (triggering for her as she has a history of binging and purging in the past).    Our plan has been to focus solely on behavioral health until she feels ready to address any efforts at weight loss      2. Generalized anxiety disorder  Assessment & Plan:  MITESH 7 scores:  Baseline was 11 in 9/2022  Had gone down to 7 before coming back up to 10 on 3/14/23  Has chosen to pursue psychotherapy alone without pharmacotherapy to this point  She was making some progress with Mina, but hasn't seen him in awhile due to some scheduling issues; she is scheduled to see him again next Friday (one week from today)  MITESH 7 score today (6/1/23): 8  Discussed a trial of low dose citalopram 10 mg daily (start with 1/2 tablet for first week or two) -- she wants to wait and start in July      3. MACARENA (obstructive sleep apnea)  Assessment & Plan:  Wearing CPAP ~50% of the time -- started toward the end of April  Good response to CPAP with improved energy level  Reports that the nights she is not using it is mostly related to increased anxiety rather than due to any issues related to the CPAP itself  She did have some difficulty finding a nasal piece that worked and so she is using the full mask        4. Migraine without aura and without status migrainosus, not intractable  Assessment & Plan:  Since last visit, 2 to 3 mild headaches  One migraine that was severe enough to take a cafergot (but she didn't take it; admits to being anxious about trying new medications)      5. Healthcare maintenance  Assessment & Plan:  Health goals:  1)  Lose weight  2)  Be less anxious  3)  Avoid getting BETANCOURT and DM        Follow up in about 8 weeks (around 7/28/2023).    No orders of the defined types were placed in this encounter.      Health Maintenance  Health Maintenance         Date  Due Completion Date    TETANUS VACCINE Never done ---    COVID-19 Vaccine (3 - Pfizer series) 10/09/2021 8/14/2021    Cervical Cancer Screening 10/26/2027 10/26/2022            ROSA Martini MD/[facility]  Internal Medicine  [facility]  [phone]

## 2023-06-01 NOTE — ASSESSMENT & PLAN NOTE
Wearing CPAP ~50% of the time -- started toward the end of April  Good response to CPAP with improved energy level  Reports that the nights she is not using it is mostly related to increased anxiety rather than due to any issues related to the CPAP itself  She did have some difficulty finding a nasal piece that worked and so she is using the full mask

## 2023-06-01 NOTE — ASSESSMENT & PLAN NOTE
See prior note  Avoiding visits with dietitian currently (triggering for her as she has a history of binging and purging in the past).    Our plan has been to focus solely on behavioral health until she feels ready to address any efforts at weight loss

## 2023-06-01 NOTE — ASSESSMENT & PLAN NOTE
MITESH 7 scores:  Baseline was 11 in 9/2022  Had gone down to 7 before coming back up to 10 on 3/14/23  Has chosen to pursue psychotherapy alone without pharmacotherapy to this point  She was making some progress with Mina, but hasn't seen him in awhile due to some scheduling issues; she is scheduled to see him again next Friday (one week from today)  MITESH 7 score today (6/1/23): 8  Discussed a trial of low dose citalopram 10 mg daily (start with 1/2 tablet for first week or two) -- she wants to wait and start in July

## 2023-06-02 ENCOUNTER — OFFICE VISIT (OUTPATIENT)
Dept: PRIMARY CARE CLINIC | Facility: CLINIC | Age: 33
End: 2023-06-02
Attending: INTERNAL MEDICINE
Payer: COMMERCIAL

## 2023-06-02 VITALS
OXYGEN SATURATION: 98 % | DIASTOLIC BLOOD PRESSURE: 84 MMHG | SYSTOLIC BLOOD PRESSURE: 114 MMHG | HEART RATE: 106 BPM | BODY MASS INDEX: 42.62 KG/M2 | TEMPERATURE: 98 F | HEIGHT: 62 IN

## 2023-06-02 DIAGNOSIS — E66.01 CLASS 3 SEVERE OBESITY DUE TO EXCESS CALORIES WITHOUT SERIOUS COMORBIDITY WITH BODY MASS INDEX (BMI) OF 40.0 TO 44.9 IN ADULT: ICD-10-CM

## 2023-06-02 DIAGNOSIS — G47.33 OSA (OBSTRUCTIVE SLEEP APNEA): ICD-10-CM

## 2023-06-02 DIAGNOSIS — G43.009 MIGRAINE WITHOUT AURA AND WITHOUT STATUS MIGRAINOSUS, NOT INTRACTABLE: ICD-10-CM

## 2023-06-02 DIAGNOSIS — F41.1 GENERALIZED ANXIETY DISORDER: ICD-10-CM

## 2023-06-02 DIAGNOSIS — Z00.00 HEALTHCARE MAINTENANCE: ICD-10-CM

## 2023-06-02 PROCEDURE — 99499 NO LOS: ICD-10-PCS | Mod: S$GLB,,, | Performed by: INTERNAL MEDICINE

## 2023-06-02 PROCEDURE — 99999 PR PBB SHADOW E&M-EST. PATIENT-LVL III: CPT | Mod: PBBFAC,,, | Performed by: INTERNAL MEDICINE

## 2023-06-02 PROCEDURE — 99999 PR PBB SHADOW E&M-EST. PATIENT-LVL III: ICD-10-PCS | Mod: PBBFAC,,, | Performed by: INTERNAL MEDICINE

## 2023-06-02 PROCEDURE — 99499 UNLISTED E&M SERVICE: CPT | Mod: S$GLB,,, | Performed by: INTERNAL MEDICINE

## 2023-06-02 RX ORDER — CITALOPRAM 10 MG/1
10 TABLET ORAL DAILY
Qty: 30 TABLET | Refills: 11 | Status: SHIPPED | OUTPATIENT
Start: 2023-06-02 | End: 2023-06-07 | Stop reason: SDUPTHER

## 2023-06-02 NOTE — PATIENT INSTRUCTIONS
"PLEASE REMEMBER TO CALLL US FIRST with any medical questions or concerns. We try very hard to keep you out of the emergency room if we are able to see you and help with your concern. It is one of our many ways to keep our patients healthy.   For now the best way to reach us is by calling Ivy's direct number:  CALL OUR OFFICE AT: [phone]    You can also schedule with us through the portal.   Dr. Ender Soria and KAI Payne are both here and available most days of the week if you need to be seen in person.  I am here every Friday but can also make myself available on other days if necessary as long as I have enough notice.  Please do not ever hesitate to reach to me via the patient portal.      Please let us know if you have any challenges getting scheduled with our behavioral therapist/ or dietitian or if you have any difficulty with any new medication(s) that were prescribed.     Dr. GUTIERREZ     Treating Anxiety  Anxiety is very common and can come in different forms.  Anxiety can have a clear inherited component, but can also be exacerbated by life altering stressful circumstances and the complex interplay of past life experiences.  All of us can develop coping mechanisms that shape how we respond to these conditions, some of which may be very healthy, but others that are not.     Anxiety should ever be thought of as lack of "mental toughness" or a personal failure, but rather as a set of conditions with complex biological and psychosocial causation that can improve with appropriate treatment.       We can use an objective measurement tool known as the MITESH-7 to quantify the severity of anxiety (> 15 is severe) and we can also use it to track your response to therapy, just like we might do with blood pressure for a person who takes medication for high blood pressure.      The treatment of anxiety typically involves some combination of the following:  --psychotherapy or cognitive behavioral " "therapy, or other form of mental health counseling.  The goal of such therapy is to help people understand their thought processes and change how they may react or respond to feelings and thoughts.    --mindfulness, meditation, and relaxation therapy (guided imagery; long, slow, deep breaths) have all been shown to be beneficial in helping with anxiety--however, medications are often necessary and should not be feared but used when advised.      There are some mindfulness apps that you may find useful.  These include Calm, Headspace, Worry Knot, and Unwinding Anxiety.      The family of medications known as SSRIs (for selective serotonin reuptake inhibitors) are first-line therapy for anxiety, and often can be beneficial even at low dosages.  Examples of these medications include Celexa (citalopram), Lexapro (escitalopram), Zoloft (sertraline), Paxil (paroxetine), and Prozac (fluoxetine).  These medications may take 2 to 3 weeks before you start to see improvement, and side effects, when they occur, usually dissipate within the first 2 weeks.      The SSRIs vary in that some are more "sedating" like paroxetine and citalopram while others are more "activating" (sertraline and fluoxetine).  Since it takes a few weeks for these medications to start working, we may need to us a "bridge" medication to give you some more immediate help while we wait for these medications to start helping.      Examples of these "bridge" medications include hydroxyzine and doxepin (but only if you are < 65 years) or buspirone (starting anywhere from 5 mg to 10 mg twice daily).  Another option is gabapentin 100-300 mg at bedtime, which along with hydroxyzine or doxepin, may help you sleep.      Sometimes, we may even need to use a class of medications known as benzodiazepines, like clonazepam 0.25 or 0.5 mg at night for 2 weeks, but we try to avoid these medications when at all possible because of their potential to cause dependence (the " "dependence can happen through no fault of your own, but because of alterations in your brain chemistry caused by these medications).     Anxiety is mediated by a brain neurotransmitter called serotonin.  It may be useful to think of the SSRIs as acting on the "serotonin center" in the brain.  They work by slowly and gradually restoring the balance of uptake of serotonin.  It may also be useful to imagine a second center in the brain -- the "anxiety output" center -- when this center is being stimulated (because of the imbalance of serotonin), your heart may start racing or feeling like it is pounding out of your chest; you may feel like you are having trouble breathing, and you may experience tingling sensations, all over.  When these symptoms are severe, it is called a panic attack. As you may know from experience, it is very real -- you may even feel like you are about to die.     These symptoms will not respond immediately to an SSRI.  Instead treatment may require a second type of medicine, known as a benzodiazepine.  Examples of benzodiazepines include alprazolam (Xanax), lorazepam (Ativan), diazepam (Valium), or clonazepam (Klonopin).      Any of the benzodiazepines can make you feel better much more quickly, but they should not be used chronically -- only for acute relief of symptoms.  Through no fault of your own, dependence and overreliance on these medications can occur way too quickly.  If you need to take any of these medications on a daily basis it means you are at risk of developing tolerance, which means that the dose you are taking will stop working for you.  The first sign that this is happening is that you will notice the same dose not working as well for you anymore.       Learning from our own mistakes, the broader medical community now realizes that benzodiazepines have been overprescribed in the past, and should really never be taken on a regular basis.  Instead, we can use less problematic " medications such as hydroxyzine instead, but even hydroxyzine should be used on an as needed only basis.  And the benzodiazepines should really be reserved for treating acute anxiety attacks and typically not more often than a few times per month.

## 2023-06-02 NOTE — ASSESSMENT & PLAN NOTE
Since last visit, 2 to 3 mild headaches  One migraine that was severe enough to take a cafergot (but she didn't take it; admits to being anxious about trying new medications)

## 2023-06-07 ENCOUNTER — PATIENT MESSAGE (OUTPATIENT)
Dept: PRIMARY CARE CLINIC | Facility: CLINIC | Age: 33
End: 2023-06-07
Payer: COMMERCIAL

## 2023-06-07 DIAGNOSIS — F41.1 GENERALIZED ANXIETY DISORDER: ICD-10-CM

## 2023-06-07 RX ORDER — CITALOPRAM 10 MG/1
10 TABLET ORAL DAILY
Qty: 30 TABLET | Refills: 11 | Status: SHIPPED | OUTPATIENT
Start: 2023-06-07 | End: 2023-10-06 | Stop reason: SDUPTHER

## 2023-06-08 ENCOUNTER — PATIENT MESSAGE (OUTPATIENT)
Dept: PRIMARY CARE CLINIC | Facility: CLINIC | Age: 33
End: 2023-06-08
Payer: COMMERCIAL

## 2023-06-09 ENCOUNTER — CLINICAL SUPPORT (OUTPATIENT)
Dept: PRIMARY CARE CLINIC | Facility: CLINIC | Age: 33
End: 2023-06-09
Payer: COMMERCIAL

## 2023-06-09 DIAGNOSIS — F41.1 GENERALIZED ANXIETY DISORDER: Primary | ICD-10-CM

## 2023-06-09 PROCEDURE — 99499 UNLISTED E&M SERVICE: CPT | Mod: S$GLB,,, | Performed by: SOCIAL WORKER

## 2023-06-09 PROCEDURE — 99499 NO LOS: ICD-10-PCS | Mod: S$GLB,,, | Performed by: SOCIAL WORKER

## 2023-06-09 RX ORDER — NITROFURANTOIN 25; 75 MG/1; MG/1
100 CAPSULE ORAL 2 TIMES DAILY
Qty: 10 CAPSULE | Refills: 0 | Status: SHIPPED | OUTPATIENT
Start: 2023-06-09 | End: 2023-08-04

## 2023-06-09 NOTE — PROGRESS NOTES
"Individual Psychotherapy (LCSW/PhD)  Radha Pickettmagen,  6/9/2023    Site:  [facility]         Therapeutic Intervention: Met with patient for individual psychotherapy.    Chief complaint/reason for encounter: anxiety     Interval history and content of current session: Pt states they have been "feeling good" and states that stress at work has been "ok". LCSW inquired about pt's current triggers for anxiety and behaviors they find difficult. LCSW and pt discussed pt's difficulty setting boundaries at work and home.    Pt states they have trouble "handing off" projects to colleagues and feel they must be in control. LCSW and pt discussed pt's "biggest fear" with handing off projects. Pt and LCSW discussed pt's attempts to stratify/triage needs. LCSW and pt discussed trying to find a "middle ground"  and possibly communicate needs to supervisor etc. LCSW and pt discussed ways to self-advocate to prevent increased stress.    Pt states they have discussed boundaries with colleagues especially.LCSW and pt discussed pt's health. Pt states that per last PCP visit they are feel like they are in good health. LCSW and pt agreed to check-in in 1mth..    Treatment plan:  Target symptoms: anxiety   Why chosen therapy is appropriate versus another modality: relevant to diagnosis, evidence based practice  Outcome monitoring methods: self-report, checklist/rating scale  Therapeutic intervention type: behavior modifying psychotherapy, supportive psychotherapy    Risk parameters:  Patient reports no suicidal ideation  Patient reports no homicidal ideation  Patient reports no self-injurious behavior  Patient reports no violent behavior    Verbal deficits: None    Patient's response to intervention:  The patient's response to intervention is accepting.    Progress toward goals and other mental status changes:  The patient's progress toward goals is limited.    Diagnosis:   No diagnosis found.    Plan: Pt plans to continue " individual psychotherapy    Return to clinic: as needed    Length of Service (minutes): 45

## 2023-06-26 ENCOUNTER — OFFICE VISIT (OUTPATIENT)
Dept: DERMATOLOGY | Facility: CLINIC | Age: 33
End: 2023-06-26
Payer: COMMERCIAL

## 2023-06-26 DIAGNOSIS — Z12.83 SCREENING EXAM FOR SKIN CANCER: ICD-10-CM

## 2023-06-26 DIAGNOSIS — L82.1 SK (SEBORRHEIC KERATOSIS): ICD-10-CM

## 2023-06-26 DIAGNOSIS — D23.9 DERMATOFIBROMA: ICD-10-CM

## 2023-06-26 DIAGNOSIS — D18.01 ANGIOMA OF SKIN: ICD-10-CM

## 2023-06-26 DIAGNOSIS — D22.9 MULTIPLE BENIGN NEVI: Primary | ICD-10-CM

## 2023-06-26 PROCEDURE — 1159F PR MEDICATION LIST DOCUMENTED IN MEDICAL RECORD: ICD-10-PCS | Mod: CPTII,S$GLB,, | Performed by: DERMATOLOGY

## 2023-06-26 PROCEDURE — 1160F PR REVIEW ALL MEDS BY PRESCRIBER/CLIN PHARMACIST DOCUMENTED: ICD-10-PCS | Mod: CPTII,S$GLB,, | Performed by: DERMATOLOGY

## 2023-06-26 PROCEDURE — 1159F MED LIST DOCD IN RCRD: CPT | Mod: CPTII,S$GLB,, | Performed by: DERMATOLOGY

## 2023-06-26 PROCEDURE — 99999 PR PBB SHADOW E&M-EST. PATIENT-LVL III: ICD-10-PCS | Mod: PBBFAC,,, | Performed by: DERMATOLOGY

## 2023-06-26 PROCEDURE — 1160F RVW MEDS BY RX/DR IN RCRD: CPT | Mod: CPTII,S$GLB,, | Performed by: DERMATOLOGY

## 2023-06-26 PROCEDURE — 99213 OFFICE O/P EST LOW 20 MIN: CPT | Mod: S$GLB,,, | Performed by: DERMATOLOGY

## 2023-06-26 PROCEDURE — 99999 PR PBB SHADOW E&M-EST. PATIENT-LVL III: CPT | Mod: PBBFAC,,, | Performed by: DERMATOLOGY

## 2023-06-26 PROCEDURE — 99213 PR OFFICE/OUTPT VISIT, EST, LEVL III, 20-29 MIN: ICD-10-PCS | Mod: S$GLB,,, | Performed by: DERMATOLOGY

## 2023-06-26 NOTE — PROGRESS NOTES
Subjective:      Patient ID:  Radha Kee is a 33 y.o. female who presents for   Chief Complaint   Patient presents with    Skin Check     TBSE     History of Present Illness: The patient presents for follow up of skin check.    The patient was last seen on: 5/4/2021 for skin check.  No h/o nmsc or mm.    Other skin complaints: none      Review of Systems   Skin:  Positive for activity-related sunscreen use and recent sunburn (2 weeks - shoulders and back). Negative for daily sunscreen use.   Hematologic/Lymphatic: Does not bruise/bleed easily.     Objective:   Physical Exam   Constitutional: She appears well-developed and well-nourished. She is obese.  No distress.   Neurological: She is alert and oriented to person, place, and time. She is not disoriented.   Psychiatric: She has a normal mood and affect.   Skin:   Areas Examined (abnormalities noted in diagram):   Scalp / Hair Palpated and Inspected  Head / Face Inspection Performed  Neck Inspection Performed  Chest / Axilla Inspection Performed  Abdomen Inspection Performed  Genitals / Buttocks / Groin Inspection Performed  Back Inspection Performed  RUE Inspected  LUE Inspection Performed  RLE Inspected  LLE Inspection Performed  Nails and Digits Inspection Performed                   Diagram Legend     Erythematous scaling macule/papule c/w actinic keratosis       Vascular papule c/w angioma      Pigmented verrucoid papule/plaque c/w seborrheic keratosis      Yellow umbilicated papule c/w sebaceous hyperplasia      Irregularly shaped tan macule c/w lentigo     1-2 mm smooth white papules consistent with Milia      Movable subcutaneous cyst with punctum c/w epidermal inclusion cyst      Subcutaneous movable cyst c/w pilar cyst      Firm pink to brown papule c/w dermatofibroma      Pedunculated fleshy papule(s) c/w skin tag(s)      Evenly pigmented macule c/w junctional nevus     Mildly variegated pigmented, slightly irregular-bordered macule c/w  mildly atypical nevus      Flesh colored to evenly pigmented papule c/w intradermal nevus       Pink pearly papule/plaque c/w basal cell carcinoma      Erythematous hyperkeratotic cursted plaque c/w SCC      Surgical scar with no sign of skin cancer recurrence      Open and closed comedones      Inflammatory papules and pustules      Verrucoid papule consistent consistent with wart     Erythematous eczematous patches and plaques     Dystrophic onycholytic nail with subungual debris c/w onychomycosis     Umbilicated papule    Erythematous-base heme-crusted tan verrucoid plaque consistent with inflamed seborrheic keratosis     Erythematous Silvery Scaling Plaque c/w Psoriasis     See annotation      Assessment / Plan:        Multiple benign nevi and TOBIAS   - minor problem and chronic.   Reassurance given to patient. No treatment necessary.       SK (seborrheic keratosis)   - minor problem and chronic.   Reassurance given to patient. No treatment necessary.       Dermatofibroma   - minor problem and chronic.   Reassurance given to patient. No treatment necessary.       Angioma of skin   - minor problem and chronic.   Reassurance given to patient. No treatment necessary.       Screening exam for skin cancer  Total body skin examination performed today including at least 12 points as noted in physical examination. No lesions suspicious for malignancy noted.    Recommend daily sun protection/avoidance, use of at least SPF 30, broad spectrum sunscreen (OTC drug), skin self examinations, and routine physician surveillance to optimize early detection               No follow-ups on file.

## 2023-06-28 ENCOUNTER — PATIENT MESSAGE (OUTPATIENT)
Dept: PRIMARY CARE CLINIC | Facility: CLINIC | Age: 33
End: 2023-06-28
Payer: COMMERCIAL

## 2023-07-10 ENCOUNTER — PATIENT MESSAGE (OUTPATIENT)
Dept: PRIMARY CARE CLINIC | Facility: CLINIC | Age: 33
End: 2023-07-10
Payer: COMMERCIAL

## 2023-07-21 ENCOUNTER — CLINICAL SUPPORT (OUTPATIENT)
Dept: PRIMARY CARE CLINIC | Facility: CLINIC | Age: 33
End: 2023-07-21
Payer: COMMERCIAL

## 2023-07-21 DIAGNOSIS — F41.1 GENERALIZED ANXIETY DISORDER: Primary | ICD-10-CM

## 2023-07-21 PROCEDURE — 90837 PSYTX W PT 60 MINUTES: CPT | Mod: S$GLB,,, | Performed by: SOCIAL WORKER

## 2023-07-21 PROCEDURE — 90837 PR PSYCHOTHERAPY W/PATIENT, 60 MIN: ICD-10-PCS | Mod: S$GLB,,, | Performed by: SOCIAL WORKER

## 2023-07-21 NOTE — PROGRESS NOTES
"Individual Psychotherapy (LCSW/PhD)  Radha Kee,  7/21/2023    Site:  [facility]         Therapeutic Intervention: Met with patient for individual psychotherapy.    Chief complaint/reason for encounter: anxiety and behavior     Interval history and content of current session: Pt reports "doing ok" and states work has been "fine". Pt states they have been receiving more support from co-workers and have been working with others to set more boundaries.    Pt continues to support aunt (mental health challenges) which has been challenging. Pt has interdiction rights I.e. finances. Pt has removed alerts from phone so that aunt does not have immediate access to pt unless pt is checking phone. Pt has been taking Celexa which they feel has reduce their overall anxiety. Pt does report being bothered by "lack of motivation" and states they feel like they are learning to adapt to the "new me". Pt identifies work and a major stressor due to increasing workload. LCSW and pt disucssed improtance of self-care and continuing to set boundaries at work.    Pt reports using more for a "check-in and mindfulness"/[employer] more. Pt has also been going for a massage for stress relief. Pt and LCSW discussed referral to health . Pt feels like they don;t want to focus too much on eating behaviors but would like some support. LCSW and pt agreed to referral to health  for pt's goals.  LCSW and opt agreed to check-in 1 month.    Treatment plan:  Target symptoms: anxiety , work stress  Why chosen therapy is appropriate versus another modality: relevant to diagnosis, evidence based practice  Outcome monitoring methods: self-report, checklist/rating scale  Therapeutic intervention type: supportive psychotherapy    Risk parameters:  Patient reports no suicidal ideation  Patient reports no homicidal ideation  Patient reports no self-injurious behavior  Patient reports no violent behavior    Verbal deficits: None    Patient's " response to intervention:  The patient's response to intervention is accepting.    Progress toward goals and other mental status changes:  The patient's progress toward goals is fair .    Diagnosis:   No diagnosis found.    Plan: Pt plans to continue individual psychotherapy    Return to clinic: as needed    Length of Service (minutes): 60

## 2023-08-03 NOTE — PROGRESS NOTES
Intensive Primary Care Provider Appointment  Shared Note: Ivy David & Sandip DILLON)    Subjective:      Patient ID: Radha Kee is a 33 y.o. female.    Chief Complaint: Follow-up      HPI:    HPI  Here for follow-up.  No acute concerns or problems.    Started citalopram 10 mg about one month ago.  She does feel like it has helped.  If anything she has noticed some apathy which is confusing for her, because in the past she has used her anxiety as a motivator.  She and the LCSW are working through that with therapy.     No recent binge eating.  Her last episode was in March/April  Our LCSW feels like she is ready for health coaching.  Not exercising currently.         Review of Systems  Review of Systems   Constitutional:  Positive for malaise/fatigue (mild).   Respiratory:  Negative for cough and shortness of breath.    Cardiovascular:  Negative for chest pain.   Gastrointestinal:  Positive for heartburn. Negative for abdominal pain, nausea and vomiting.   Genitourinary:  Negative for hematuria.   Musculoskeletal:  Negative for joint pain and myalgias.   Neurological:  Negative for headaches.         Objective:   Mental Health Screening  PHQ-9       MITESH-7  Generalized Anxiety Disorder 7-item (MITESH-7) Scale. HOW OFTEN DURING THE PAST 2 WEEKS HAVE YOU FELT BOTHERED BY:  1. Feeling nervous, anxious, or on edge?: Not at all  2. Not being able to stop or control worrying?: Not at all  3. Worrying too much about different things?: Not at all  4. Trouble relaxing?: Several days  5. Being so restless that it is hard to sit still?: Several days  6. Becoming easily annoyed or irritable?: Not at all  7. Feeling afraid as if something awful might happen?: Not at all  8. If you checked off any problems, how difficult have these problems made it for you to do your work, take care of things at home, or get along with other people?: Not difficult at all  MITESH-7 Score: 2  Number answered (out of first 7):  "7  Interpretation: Normal    Wt Readings from Last 6 Encounters:   08/04/23 105.7 kg (233 lb 0.4 oz)   03/14/23 105.7 kg (233 lb 0.4 oz)   02/07/23 105.7 kg (233 lb)   01/20/23 106.2 kg (234 lb 2.1 oz)   01/10/23 106.6 kg (235 lb)   12/06/22 106.4 kg (234 lb 9.1 oz)   ]  BP Readings from Last 4 Encounters:   08/04/23 110/76   06/02/23 114/84   03/14/23 126/88   02/07/23 128/84   ]    Vital Signs  /76 (BP Location: Right arm, Patient Position: Sitting, BP Method: Medium (Manual))   Pulse 100   Resp 18   Ht 5' 2" (1.575 m)   Wt 105.7 kg (233 lb 0.4 oz)   SpO2 98%   BMI 42.62 kg/m²   Physical Exam  Physical Exam  Vitals and nursing note reviewed.   Constitutional:       Appearance: Normal appearance.   HENT:      Head: Normocephalic.   Eyes:      Conjunctiva/sclera: Conjunctivae normal.   Cardiovascular:      Rate and Rhythm: Normal rate and regular rhythm.   Pulmonary:      Effort: Pulmonary effort is normal.      Breath sounds: Normal breath sounds.   Skin:     General: Skin is warm and dry.   Neurological:      Mental Status: She is alert and oriented to person, place, and time. Mental status is at baseline.   Psychiatric:         Mood and Affect: Mood normal.         Behavior: Behavior normal.         Thought Content: Thought content normal.           Assessment:   33 y.o. female here for primary care visit       Plan:   1. Healthcare maintenance  Assessment & Plan:  Health goals:  1)  Lose weight  2)  Be less anxious  3)  Avoid getting BETANCOURT and DM    No hepatic steatosis on US abdomen from 2021 and normal liver enzymes  Lab Results   Component Value Date    ALT 23 09/06/2022    AST 16 09/06/2022    ALKPHOS 85 09/06/2022    BILITOT 0.7 09/06/2022     Lab Results   Component Value Date    HGBA1C 5.1 09/06/2022             2. Class 3 severe obesity due to excess calories without serious comorbidity with body mass index (BMI) of 40.0 to 44.9 in adult  Assessment & Plan:  Current BMI 42.62 with weight 233 " cydney  Is starting health coaching with Zuleyma soon      3. Generalized anxiety disorder  Assessment & Plan:  GAD7 score today (8/4/23) down to 2  (since starting citalopram one month ago)  Baseline was 11 and most recent score from 6/1/23 was 8  Significant improvement  Continues with our LCSW (Mina)  And has started health coaching!      4. MACARENA (obstructive sleep apnea)  Assessment & Plan:  Still using intermittently, still at about 50% of the time  Her biggest barrier there is falling asleep on the couch and waking up at 2:30 AM.  At that point, putting on the mask is wakeful, and she will typically skip those nights.    I asked her to address this particular habit with her health  so that we can start working toward healthier sleeping habits that foster more regular use of CPAP.  My hope is that by wearing the CPAP more regularly that she will have more energy, feel better during the day, get more restorative sleep, feel better rested, and eventually start exercising regularly.  Hopefully we will convert our current viscous cycle into a virtuous one.      5. Gastroesophageal reflux disease without esophagitis  Assessment & Plan:  Occasional symptoms  Back up to using omeprazole about three times per week      6. Migraine without aura and without status migrainosus, not intractable  Assessment & Plan:  No headaches recently  Has never needed to take a cafergot        Follow up in about 3 months (around 11/4/2023).    No orders of the defined types were placed in this encounter.      Health Maintenance  Health Maintenance         Date Due Completion Date    TETANUS VACCINE Never done ---    COVID-19 Vaccine (3 - Pfizer series) 10/09/2021 8/14/2021    Influenza Vaccine (1) 09/01/2023 11/29/2022    Cervical Cancer Screening 10/26/2027 10/26/2022            ROSA Martini MD/[facility]  Internal Medicine  [facility]  [phone]

## 2023-08-03 NOTE — ASSESSMENT & PLAN NOTE
Health goals:  1)  Lose weight  2)  Be less anxious  3)  Avoid getting BETANCOURT and DM    No hepatic steatosis on US abdomen from 2021 and normal liver enzymes  Lab Results   Component Value Date    ALT 23 09/06/2022    AST 16 09/06/2022    ALKPHOS 85 09/06/2022    BILITOT 0.7 09/06/2022     Lab Results   Component Value Date    HGBA1C 5.1 09/06/2022

## 2023-08-04 ENCOUNTER — OFFICE VISIT (OUTPATIENT)
Dept: PRIMARY CARE CLINIC | Facility: CLINIC | Age: 33
End: 2023-08-04
Attending: INTERNAL MEDICINE
Payer: COMMERCIAL

## 2023-08-04 VITALS
HEART RATE: 100 BPM | HEIGHT: 62 IN | OXYGEN SATURATION: 98 % | SYSTOLIC BLOOD PRESSURE: 110 MMHG | BODY MASS INDEX: 42.88 KG/M2 | WEIGHT: 233 LBS | DIASTOLIC BLOOD PRESSURE: 76 MMHG | RESPIRATION RATE: 18 BRPM

## 2023-08-04 DIAGNOSIS — F41.1 GENERALIZED ANXIETY DISORDER: ICD-10-CM

## 2023-08-04 DIAGNOSIS — E66.01 CLASS 3 SEVERE OBESITY DUE TO EXCESS CALORIES WITHOUT SERIOUS COMORBIDITY WITH BODY MASS INDEX (BMI) OF 40.0 TO 44.9 IN ADULT: ICD-10-CM

## 2023-08-04 DIAGNOSIS — G43.009 MIGRAINE WITHOUT AURA AND WITHOUT STATUS MIGRAINOSUS, NOT INTRACTABLE: ICD-10-CM

## 2023-08-04 DIAGNOSIS — Z00.00 HEALTHCARE MAINTENANCE: ICD-10-CM

## 2023-08-04 DIAGNOSIS — K21.9 GASTROESOPHAGEAL REFLUX DISEASE WITHOUT ESOPHAGITIS: ICD-10-CM

## 2023-08-04 DIAGNOSIS — G47.33 OSA (OBSTRUCTIVE SLEEP APNEA): ICD-10-CM

## 2023-08-04 PROCEDURE — 99999 PR PBB SHADOW E&M-EST. PATIENT-LVL III: CPT | Mod: PBBFAC,,, | Performed by: INTERNAL MEDICINE

## 2023-08-04 PROCEDURE — 99999 PR PBB SHADOW E&M-EST. PATIENT-LVL III: ICD-10-PCS | Mod: PBBFAC,,, | Performed by: INTERNAL MEDICINE

## 2023-08-04 PROCEDURE — 99499 UNLISTED E&M SERVICE: CPT | Mod: S$GLB,,, | Performed by: INTERNAL MEDICINE

## 2023-08-04 PROCEDURE — 99499 NO LOS: ICD-10-PCS | Mod: S$GLB,,, | Performed by: INTERNAL MEDICINE

## 2023-08-04 NOTE — ASSESSMENT & PLAN NOTE
GAD7 score today (8/4/23) down to 2  (since starting citalopram one month ago)  Baseline was 11 and most recent score from 6/1/23 was 8  Significant improvement  Continues with our LCSW (Mina)  And has started health coaching!

## 2023-08-04 NOTE — ASSESSMENT & PLAN NOTE
Still using intermittently, still at about 50% of the time  Her biggest barrier there is falling asleep on the couch and waking up at 2:30 AM.  At that point, putting on the mask is wakeful, and she will typically skip those nights.    I asked her to address this particular habit with her health  so that we can start working toward healthier sleeping habits that foster more regular use of CPAP.  My hope is that by wearing the CPAP more regularly that she will have more energy, feel better during the day, get more restorative sleep, feel better rested, and eventually start exercising regularly.  Hopefully we will convert our current viscous cycle into a virtuous one.

## 2023-08-04 NOTE — PATIENT INSTRUCTIONS
PLEASE REMEMBER TO CALLL US FIRST with any medical questions or concerns. We try very hard to keep you out of the emergency room if we are able to see you and help with your concern. It is one of our many ways to keep our patients healthy.   For now the best way to reach us is by calling Ivy's direct number:  CALL OUR OFFICE AT: [phone]    You can also schedule with us through the portal.   Dr. Ender Soria and KAI Payne are both here and available most days of the week if you need to be seen in person.  I am here every Friday but can also make myself available on other days if necessary as long as I have enough notice.  Please do not ever hesitate to reach to me via the patient portal.      Please let us know if you have any challenges getting scheduled with our behavioral therapist/ or dietitian or if you have any difficulty with any new medication(s) that were prescribed.     Dr. GUTIERREZ

## 2023-08-08 ENCOUNTER — CLINICAL SUPPORT (OUTPATIENT)
Dept: NUTRITION | Facility: CLINIC | Age: 33
End: 2023-08-08
Payer: COMMERCIAL

## 2023-08-08 DIAGNOSIS — Z71.9 HEALTH EDUCATION/COUNSELING: Primary | ICD-10-CM

## 2023-08-08 NOTE — PROGRESS NOTES
The patient location is: [place]  The chief complaint leading to consultation is: [facility] Coaching    Visit type: audiovisual    Face to Face time with patient: 30 min  60 minutes of total time spent on the encounter, which includes face to face time and non-face to face time preparing to see the patient (eg, review of tests), Obtaining and/or reviewing separately obtained history, Documenting clinical information in the electronic or other health record, Independently interpreting results (not separately reported) and communicating results to the patient/family/caregiver, or Care coordination (not separately reported).         Each patient to whom he or she provides medical services by telemedicine is:  (1) informed of the relationship between the physician and patient and the respective role of any other health care provider with respect to management of the patient; and (2) notified that he or she may decline to receive medical services by telemedicine and may withdraw from such care at any time.    Notes:   Initial Health Coaching Assessment  Client name:  Radha Kee  :  1990  Age:  33 y.o.  Gender:  female    Subjective    Client states:  Very pleasant [employer] employee completing her fist health coaching session virtually as part of [facility]. 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 She saw Reuben for nutrition counseling beginning late last year and states that she has a good nutrition understanding. She states she is currently working with Mina in behavioral health on binge eating. She feels that setting a goal toward more consistently wearing her CPAP machine would be a good place to start. She doesn't want to commit to a certain number of days per week, but would like to focus on wearing it on nights when she has to work the next day. She admits to struggling with motivation and welcomes  accountability.      Objective    Anthropometrics  Vitals - 1 value per visit 8/4/2023   SYSTOLIC 110   DIASTOLIC 76   Pulse 100   Temp -   Resp 18   SPO2 98   Weight (lb) 233.03   Weight (kg) 105.7   Height 62   BMI (Calculated) 42.6   VISIT REPORT [id number]  [id number];   Waist Measurements -   Pain Score  -         Past Medical History:   Diagnosis Date    Abnormal Pap smear of cervix 2019    + HR HPV    Allergy     History of acne     Migraine headache     without aura       Past Surgical History:   Procedure Laterality Date    BUNIONECTOMY  2005    bilateral        Medications    has a current medication list which includes the following prescription(s): citalopram, ergotamine-caffeine 1-100 mg, and omeprazole.        Social History    Marital status:  Single  Employment:  [employer] works 11-7 M-F    Social History     Tobacco Use    Smoking status: Never    Smokeless tobacco: Never   Substance Use Topics    Alcohol use: Yes     Alcohol/week: 1.0 standard drink of alcohol     Types: 1 Glasses of wine per week     Comment: social       Lab Reports     Lab Results   Component Value Date    WBC 18.56 (H) 08/27/2021    HGB 13.7 08/27/2021    HCT 41 08/27/2021    MCV 88 08/27/2021     08/27/2021       Sodium   Date Value Ref Range Status   09/06/2022 136 136 - 145 mmol/L Final     Potassium   Date Value Ref Range Status   09/06/2022 3.9 3.5 - 5.1 mmol/L Final     Chloride   Date Value Ref Range Status   09/06/2022 105 95 - 110 mmol/L Final     CO2   Date Value Ref Range Status   09/06/2022 23 23 - 29 mmol/L Final     Glucose   Date Value Ref Range Status   09/06/2022 96 70 - 110 mg/dL Final     BUN   Date Value Ref Range Status   09/06/2022 8 6 - 20 mg/dL Final     Creatinine   Date Value Ref Range Status   09/06/2022 0.8 0.5 - 1.4 mg/dL Final     Calcium   Date Value Ref Range Status   09/06/2022 9.3 8.7 - 10.5 mg/dL Final     Total Protein   Date Value Ref Range Status   09/06/2022 8.1  6.0 - 8.4 g/dL Final     Albumin   Date Value Ref Range Status   09/06/2022 4.0 3.5 - 5.2 g/dL Final     Total Bilirubin   Date Value Ref Range Status   09/06/2022 0.7 0.1 - 1.0 mg/dL Final     Comment:     For infants and newborns, interpretation of results should be based  on gestational age, weight and in agreement with clinical  observations.    Premature Infant recommended reference ranges:  Up to 24 hours.............<8.0 mg/dL  Up to 48 hours............<12.0 mg/dL  3-5 days..................<15.0 mg/dL  6-29 days.................<15.0 mg/dL       Alkaline Phosphatase   Date Value Ref Range Status   09/06/2022 85 55 - 135 U/L Final     AST   Date Value Ref Range Status   09/06/2022 16 10 - 40 U/L Final     ALT   Date Value Ref Range Status   09/06/2022 23 10 - 44 U/L Final     Anion Gap   Date Value Ref Range Status   09/06/2022 8 8 - 16 mmol/L Final     eGFR if    Date Value Ref Range Status   12/16/2020 >60.0 >60 mL/min/1.73 m^2 Final     eGFR if non    Date Value Ref Range Status   12/16/2020 >60.0 >60 mL/min/1.73 m^2 Final     Comment:     Calculation used to obtain the estimated glomerular filtration  rate (eGFR) is the CKD-EPI equation.         Lab Results   Component Value Date    WBC 18.56 (H) 08/27/2021    HGB 13.7 08/27/2021    HCT 41 08/27/2021    MCV 88 08/27/2021     08/27/2021        Lab Results   Component Value Date    CHOL 154 09/06/2022     Lab Results   Component Value Date    HDL 40 09/06/2022     Lab Results   Component Value Date    LDLCALC 105.0 09/06/2022     Lab Results   Component Value Date    TRIG 45 09/06/2022     Lab Results   Component Value Date    CHOLHDL 26.0 09/06/2022     Lab Results   Component Value Date    HGBA1C 5.1 09/06/2022     BP Readings from Last 1 Encounters:   08/04/23 110/76       Assessment    State of Change:  Preparation    Barriers to Change: Perception of lack of motivation      Plan    Goals:  Increase CPAP usage,  aiming at a minimum to use nightly when she works the next day.  Increase knowledge base on habits by listening to podcasts featuring Stanley Das and consider the audio book Atomic Habits.    Education:  Reviewed recent positive, health-promoting steps such as working with behavioral health on binge eating. Discussed how increased utilization of CPAP could have benefits in many areas of life: energy, mood, motivation, appetite regulation etc. Encouraged client to be mindful and try to take note of any improvements as she begins to use the CPAP more. Had a lengthy discussion on habit change and how habits affect not only health trajectory, but many aspects of life. She enjoys audio learning, so recommended habit-related podcasts and Atomic habits audiobook to provide further information and education; aiming for small wins.      Follow Up Plan: Oct 2023

## 2023-08-09 ENCOUNTER — PATIENT MESSAGE (OUTPATIENT)
Dept: PRIMARY CARE CLINIC | Facility: CLINIC | Age: 33
End: 2023-08-09
Payer: COMMERCIAL

## 2023-08-11 NOTE — ASSESSMENT & PLAN NOTE
Due for next PAP smear this year  Check HIV and Hep C  Check lipid panel   Reviewed chart, potassium was D/C'd in ER visit in Feb. Pt went in for trouble swallowing. Her most recent potassium level resulted 2/8/23 was on low side at 3.6.   S/W pt, she says she only takes her furosemide once or twice a month and takes the potassium along with it when she does.     OK to send Rx and do you want to repeat bmp?

## 2023-08-18 ENCOUNTER — PATIENT MESSAGE (OUTPATIENT)
Dept: PRIMARY CARE CLINIC | Facility: CLINIC | Age: 33
End: 2023-08-18
Payer: COMMERCIAL

## 2023-08-21 ENCOUNTER — OFFICE VISIT (OUTPATIENT)
Dept: OBSTETRICS AND GYNECOLOGY | Facility: CLINIC | Age: 33
End: 2023-08-21
Payer: COMMERCIAL

## 2023-08-21 VITALS — SYSTOLIC BLOOD PRESSURE: 118 MMHG | HEIGHT: 62 IN | DIASTOLIC BLOOD PRESSURE: 89 MMHG | BODY MASS INDEX: 42.62 KG/M2

## 2023-08-21 DIAGNOSIS — Z11.3 VENEREAL DISEASE SCREENING: ICD-10-CM

## 2023-08-21 DIAGNOSIS — Z12.4 PAP SMEAR FOR CERVICAL CANCER SCREENING: ICD-10-CM

## 2023-08-21 DIAGNOSIS — Z01.419 ENCOUNTER FOR GYNECOLOGICAL EXAMINATION WITHOUT ABNORMAL FINDING: Primary | ICD-10-CM

## 2023-08-21 PROCEDURE — 3008F BODY MASS INDEX DOCD: CPT | Mod: CPTII,S$GLB,, | Performed by: OBSTETRICS & GYNECOLOGY

## 2023-08-21 PROCEDURE — 88175 CYTOPATH C/V AUTO FLUID REDO: CPT | Performed by: OBSTETRICS & GYNECOLOGY

## 2023-08-21 PROCEDURE — 99999 PR PBB SHADOW E&M-EST. PATIENT-LVL III: ICD-10-PCS | Mod: PBBFAC,,, | Performed by: OBSTETRICS & GYNECOLOGY

## 2023-08-21 PROCEDURE — 99395 PR PREVENTIVE VISIT,EST,18-39: ICD-10-PCS | Mod: S$GLB,,, | Performed by: OBSTETRICS & GYNECOLOGY

## 2023-08-21 PROCEDURE — 1159F MED LIST DOCD IN RCRD: CPT | Mod: CPTII,S$GLB,, | Performed by: OBSTETRICS & GYNECOLOGY

## 2023-08-21 PROCEDURE — 87491 CHLMYD TRACH DNA AMP PROBE: CPT | Performed by: OBSTETRICS & GYNECOLOGY

## 2023-08-21 PROCEDURE — 87624 HPV HI-RISK TYP POOLED RSLT: CPT | Performed by: OBSTETRICS & GYNECOLOGY

## 2023-08-21 PROCEDURE — 3074F SYST BP LT 130 MM HG: CPT | Mod: CPTII,S$GLB,, | Performed by: OBSTETRICS & GYNECOLOGY

## 2023-08-21 PROCEDURE — 1159F PR MEDICATION LIST DOCUMENTED IN MEDICAL RECORD: ICD-10-PCS | Mod: CPTII,S$GLB,, | Performed by: OBSTETRICS & GYNECOLOGY

## 2023-08-21 PROCEDURE — 3079F PR MOST RECENT DIASTOLIC BLOOD PRESSURE 80-89 MM HG: ICD-10-PCS | Mod: CPTII,S$GLB,, | Performed by: OBSTETRICS & GYNECOLOGY

## 2023-08-21 PROCEDURE — 99999 PR PBB SHADOW E&M-EST. PATIENT-LVL III: CPT | Mod: PBBFAC,,, | Performed by: OBSTETRICS & GYNECOLOGY

## 2023-08-21 PROCEDURE — 3079F DIAST BP 80-89 MM HG: CPT | Mod: CPTII,S$GLB,, | Performed by: OBSTETRICS & GYNECOLOGY

## 2023-08-21 PROCEDURE — 99395 PREV VISIT EST AGE 18-39: CPT | Mod: S$GLB,,, | Performed by: OBSTETRICS & GYNECOLOGY

## 2023-08-21 PROCEDURE — 3074F PR MOST RECENT SYSTOLIC BLOOD PRESSURE < 130 MM HG: ICD-10-PCS | Mod: CPTII,S$GLB,, | Performed by: OBSTETRICS & GYNECOLOGY

## 2023-08-21 PROCEDURE — 3008F PR BODY MASS INDEX (BMI) DOCUMENTED: ICD-10-PCS | Mod: CPTII,S$GLB,, | Performed by: OBSTETRICS & GYNECOLOGY

## 2023-08-21 NOTE — PROGRESS NOTES
Subjective:       Patient ID: Radha Kee is a 33 y.o. female.    Chief Complaint:  Annual Exam and Gynecologic Exam      History of Present Illness  HPI    Radha Kee is a 33 y.o. female  here for her annual GYN exam.  She is in a new relationship of about 5 months, and requests STD testing.   Menarche age 13, has had her Gardasil Vaccine series.  She describes her periods as regular, normal, light flow, lasting 4-5 days.   denies break through bleeding.   denies vaginal itching or irritation.  Denies vaginal discharge.  She is sexually active. She has had1 partner for the past 5 months .  She uses no method for contraception.   History of abnormal pap: Yes - ascus, + HR HPV  Last Pap: approximate date 10- and was abnormal: +HR HPV, not 16/18, ASCUS  Last MMG: NA  Last Colonoscopy:  NA  denies domestic violence. She does feel safe at home.     Past Medical History:   Diagnosis Date    Abnormal Pap smear of cervix     + HR HPV    Allergy     History of acne     Migraine headache     without aura     Past Surgical History:   Procedure Laterality Date    BUNIONECTOMY  2005    bilateral      Social History     Socioeconomic History    Marital status: Single   Occupational History    Occupation: Student   Tobacco Use    Smoking status: Never    Smokeless tobacco: Never   Substance and Sexual Activity    Alcohol use: Yes     Alcohol/week: 1.0 standard drink of alcohol     Types: 1 Glasses of wine per week     Comment: social    Drug use: Never    Sexual activity: Yes     Partners: Male     Birth control/protection: None, Condom     Comment: condom use occasional; current partner x 5 months   Other Topics Concern    Are you pregnant or think you may be? No    Breast-feeding No     Family History   Problem Relation Age of Onset    Hypertension Mother     Liver disease Father     Cataracts Father     Amenorrhea Sister     Acne Brother     Lymphoma Maternal  "Grandmother     Diabetes Maternal Grandfather     Cancer Maternal Grandfather         skin    Hypertension Maternal Grandfather     Melanoma Maternal Grandfather     Stroke Paternal Grandfather     Heart attack Paternal Grandfather 50    Diabetes Maternal Aunt     Hypertension Maternal Aunt     Diabetes Maternal Aunt     Hypertension Maternal Aunt     Diabetes Maternal Uncle     Hypertension Maternal Uncle     Breast cancer Other 60        paternal great aunt    Amblyopia Neg Hx     Blindness Neg Hx     Glaucoma Neg Hx     Macular degeneration Neg Hx     Retinal detachment Neg Hx     Strabismus Neg Hx     Thyroid disease Neg Hx     Colon cancer Neg Hx     Ovarian cancer Neg Hx     Psoriasis Neg Hx     Lupus Neg Hx     Eczema Neg Hx      OB History          0    Para   0    Term   0       0    AB   0    Living   0         SAB   0    IAB   0    Ectopic   0    Multiple   0    Live Births                     /89   Ht 5' 2" (1.575 m)   LMP 2023   BMI 42.62 kg/m²         GYN & OB History  Patient's last menstrual period was 2023.   Date of Last Pap: 2023    OB History    Para Term  AB Living   0 0 0 0 0 0   SAB IAB Ectopic Multiple Live Births   0 0 0 0         Review of Systems  Review of Systems   Constitutional:  Negative for activity change, appetite change, fatigue and unexpected weight change.   HENT: Negative.     Eyes:  Negative for visual disturbance.   Respiratory:  Negative for shortness of breath and wheezing.    Cardiovascular:  Negative for chest pain, palpitations and leg swelling.   Gastrointestinal:  Negative for abdominal pain, bloating and blood in stool.   Endocrine: Negative for diabetes and hair loss.   Genitourinary:  Negative for decreased libido, dyspareunia, menorrhagia, menstrual problem and vaginal discharge.   Musculoskeletal:  Negative for back pain and joint swelling.   Integumentary:  Negative for acne, hair " changes and nipple discharge.   Neurological:  Positive for headaches.   Hematological:  Does not bruise/bleed easily.   Psychiatric/Behavioral:  Negative for depression and sleep disturbance. The patient is not nervous/anxious.    Breast: Negative for mastodynia and nipple discharge          Objective:      Physical Exam:   Constitutional: She is oriented to person, place, and time. She appears well-developed and well-nourished.    HENT:   Head: Normocephalic and atraumatic.    Eyes: Pupils are equal, round, and reactive to light. EOM are normal.     Cardiovascular:  Normal rate and regular rhythm.             Pulmonary/Chest: Effort normal and breath sounds normal.   BREASTS:  no mass, no tenderness, no deformity and no retraction. Right breast exhibits no inverted nipple, no mass, no nipple discharge, no skin change, no tenderness, no bleeding and no swelling. Left breast exhibits no inverted nipple, no mass, no nipple discharge, no skin change, no tenderness, no bleeding and no swelling. Breasts are symmetrical.              Abdominal: Soft. Bowel sounds are normal.     Genitourinary:    Pelvic exam was performed with patient supine.      Genitourinary Comments: PELVIC: Normal external genitalia without lesions.  Normal hair distribution.  Adequate perineal body, normal urethral meatus.  Vagina moist and well rugated without lesions or discharge.  Cervix pink, without lesions, discharge or tenderness.  No significant cystocele or rectocele.  Bimanual exam shows uterus to be normal size, regular, mobile and nontender.  Adnexa without masses or tenderness.                   Musculoskeletal: Normal range of motion and moves all extremeties.       Neurological: She is alert and oriented to person, place, and time.    Skin: Skin is warm and dry.    Psychiatric: She has a normal mood and affect.              Assessment:        1. Encounter for gynecological examination without abnormal finding    2. Pap smear for  cervical cancer screening    3. Venereal disease screening                Plan:        Problem List Items Addressed This Visit    None  Visit Diagnoses       Encounter for gynecological examination without abnormal finding    -  Primary    Pap smear for cervical cancer screening        Relevant Orders    Liquid-Based Pap Smear, Screening    HPV High Risk Genotypes, PCR    Venereal disease screening        Relevant Orders    C. trachomatis/N. gonorrhoeae by AMP DNA    Hepatitis Panel, Acute    HIV 1/2 Ag/Ab (4th Gen)    RPR             Follow up in about 1 year (around 8/21/2024).

## 2023-08-22 LAB
C TRACH DNA SPEC QL NAA+PROBE: NOT DETECTED
N GONORRHOEA DNA SPEC QL NAA+PROBE: NOT DETECTED

## 2023-08-25 ENCOUNTER — DOCUMENTATION ONLY (OUTPATIENT)
Dept: PRIMARY CARE CLINIC | Facility: CLINIC | Age: 33
End: 2023-08-25
Payer: COMMERCIAL

## 2023-08-25 LAB
FINAL PATHOLOGIC DIAGNOSIS: NORMAL
Lab: NORMAL

## 2023-08-25 NOTE — PROGRESS NOTES
"  Individual Psychotherapy (LCSW/PhD)  Radha Pickettmagen,  8/25/2023    Site:  [facility]         Therapeutic Intervention: Met with patient for individual psychotherapy.    Chief complaint/reason for encounter: anxiety     Interval history and content of current session: Pt has been trying to cope with work stress and has been very busy with work. Pt feels like their mood has "better" and that they feel less anxious. Pt reports feeling more balanced and less anxious.     Pt has set boundaries with aunt including refraining from answering phone each aunt aunt calls I.e. Pt is planning on continuing with health  Jorge and is happy with their visit.    Pt is working with health  to utilzie CPAP more and set goals inclduing reading some books about nutrition.              Treatment plan:  Target symptoms: anxiety   Why chosen therapy is appropriate versus another modality: relevant to diagnosis, evidence based practice  Outcome monitoring methods: self-report, checklist/rating scale  Therapeutic intervention type: supportive psychotherapy    Risk parameters:  Patient reports no suicidal ideation  Patient reports no homicidal ideation  Patient reports no self-injurious behavior  Patient reports no violent behavior    Verbal deficits: None    Patient's response to intervention:  The patient's response to intervention is accepting.    Progress toward goals and other mental status changes:  The patient's progress toward goals is excellent.    Diagnosis:   No diagnosis found.    Plan: Pt plans to continue individual psychotherapy    Return to clinic: 2 months    Length of Service (minutes): 30  "

## 2023-08-29 ENCOUNTER — PATIENT MESSAGE (OUTPATIENT)
Dept: OBSTETRICS AND GYNECOLOGY | Facility: CLINIC | Age: 33
End: 2023-08-29
Payer: COMMERCIAL

## 2023-09-12 ENCOUNTER — OFFICE VISIT (OUTPATIENT)
Dept: OPTOMETRY | Facility: CLINIC | Age: 33
End: 2023-09-12
Payer: COMMERCIAL

## 2023-09-12 DIAGNOSIS — G43.009 MIGRAINE WITHOUT AURA AND WITHOUT STATUS MIGRAINOSUS, NOT INTRACTABLE: ICD-10-CM

## 2023-09-12 DIAGNOSIS — H52.223 REGULAR ASTIGMATISM OF BOTH EYES: Primary | ICD-10-CM

## 2023-09-12 DIAGNOSIS — G47.33 OSA (OBSTRUCTIVE SLEEP APNEA): ICD-10-CM

## 2023-09-12 PROCEDURE — 99999 PR PBB SHADOW E&M-EST. PATIENT-LVL II: CPT | Mod: PBBFAC,,, | Performed by: OPTOMETRIST

## 2023-09-12 PROCEDURE — 92014 PR EYE EXAM, EST PATIENT,COMPREHESV: ICD-10-PCS | Mod: S$GLB,,, | Performed by: OPTOMETRIST

## 2023-09-12 PROCEDURE — 92014 COMPRE OPH EXAM EST PT 1/>: CPT | Mod: S$GLB,,, | Performed by: OPTOMETRIST

## 2023-09-12 PROCEDURE — 99999 PR PBB SHADOW E&M-EST. PATIENT-LVL II: ICD-10-PCS | Mod: PBBFAC,,, | Performed by: OPTOMETRIST

## 2023-09-12 PROCEDURE — 92015 PR REFRACTION: ICD-10-PCS | Mod: S$GLB,,, | Performed by: OPTOMETRIST

## 2023-09-12 PROCEDURE — 92015 DETERMINE REFRACTIVE STATE: CPT | Mod: S$GLB,,, | Performed by: OPTOMETRIST

## 2023-09-12 NOTE — PROGRESS NOTES
HPI    DLS: 12/6/22    No eyedrops  No eye surgery     Pt here for check of ocular health.  Pt states she has stable floaters OU.    Pt states she gets headaches.  Pt denies flashes or eye pain OU.  Pt   denies itching, tearing or burning OU.     Last edited by Alicia Noyola MA on 9/12/2023  9:25 AM.            Assessment /Plan     For exam results, see Encounter Report.    Regular astigmatism of both eyes    MACARENA (obstructive sleep apnea)    Migraine without aura and without status migrainosus, not intractable      Use PF systane complete BID/PRN     RTC 1 year DFE

## 2023-10-04 ENCOUNTER — CLINICAL SUPPORT (OUTPATIENT)
Dept: NUTRITION | Facility: CLINIC | Age: 33
End: 2023-10-04
Payer: COMMERCIAL

## 2023-10-04 DIAGNOSIS — Z71.9 HEALTH COUNSELING: Primary | ICD-10-CM

## 2023-10-04 NOTE — PROGRESS NOTES
The patient location is: [place]  The chief complaint leading to consultation is: [facility] Health Coaching    Visit type: audiovisual    Face to Face time with patient: 30 min  45 minutes of total time spent on the encounter, which includes face to face time and non-face to face time preparing to see the patient (eg, review of tests), Obtaining and/or reviewing separately obtained history, Documenting clinical information in the electronic or other health record, Independently interpreting results (not separately reported) and communicating results to the patient/family/caregiver, or Care coordination (not separately reported).         Each patient to whom he or she provides medical services by telemedicine is:  (1) informed of the relationship between the physician and patient and the respective role of any other health care provider with respect to management of the patient; and (2) notified that he or she may decline to receive medical services by telemedicine and may withdraw from such care at any time.    Notes:   Follow Up Health Coaching Assessment  Client name:  Radha Kee  :  1990  Age:  33 y.o.  Gender:  female    Subjective    Client states:  2nd health coaching session completed virtually with [facility]. She is also working with a LCSW as part of OT. Client reports improved motivation lately, she has been working on goals set last time to wear her CPAP on nights before she works. She notices that when she has not worn her CPAP the night before that she yawns a lot. She has not started podcasts or audio books but has been enjoying short mindfulness audio content on [employer], a mental health platform for healthcare workers. She would like to keep her goals the same and continue to work on improving consistency. She is not interested in tracking any new habits at this time because it has been overwhelming to her in the past. She would like to continue to follow-up with health coaching for  accountability.      Assessment    State of Change:  Action       Plan    Goals:    Increase CPAP usage, aiming at a minimum to use nightly when she works the next day.  Listen to mindfulness content on [employer]  F/u w LCSW    Education: Discussed progress to goals, obstacles and solutions. Celebrated wins/progress. Reviewed importance of progress over perfection and avoiding the all-or-nothing mindset. Suggested podcasts/audio books to increase knowledge on habits and the power of small, repeated action on health and wellness.    Follow Up:  Nov 2023

## 2023-10-06 DIAGNOSIS — F41.1 GENERALIZED ANXIETY DISORDER: ICD-10-CM

## 2023-10-09 RX ORDER — CITALOPRAM 10 MG/1
10 TABLET ORAL DAILY
Qty: 30 TABLET | Refills: 11 | Status: SHIPPED | OUTPATIENT
Start: 2023-10-09 | End: 2024-01-18 | Stop reason: SDUPTHER

## 2023-10-11 ENCOUNTER — PATIENT MESSAGE (OUTPATIENT)
Dept: PRIMARY CARE CLINIC | Facility: CLINIC | Age: 33
End: 2023-10-11

## 2023-11-08 ENCOUNTER — PATIENT MESSAGE (OUTPATIENT)
Dept: PRIMARY CARE CLINIC | Facility: CLINIC | Age: 33
End: 2023-11-08
Payer: COMMERCIAL

## 2023-11-22 ENCOUNTER — IMMUNIZATION (OUTPATIENT)
Dept: INTERNAL MEDICINE | Facility: CLINIC | Age: 33
End: 2023-11-22
Payer: COMMERCIAL

## 2023-11-22 DIAGNOSIS — Z23 NEED FOR VACCINATION: Primary | ICD-10-CM

## 2023-11-22 PROCEDURE — 90686 IIV4 VACC NO PRSV 0.5 ML IM: CPT | Mod: S$GLB,,, | Performed by: INTERNAL MEDICINE

## 2023-11-22 PROCEDURE — 90471 IMMUNIZATION ADMIN: CPT | Mod: S$GLB,,, | Performed by: INTERNAL MEDICINE

## 2023-11-22 PROCEDURE — 90686 FLU VACCINE (QUAD) GREATER THAN OR EQUAL TO 3YO PRESERVATIVE FREE IM: ICD-10-PCS | Mod: S$GLB,,, | Performed by: INTERNAL MEDICINE

## 2023-11-22 PROCEDURE — 90471 FLU VACCINE (QUAD) GREATER THAN OR EQUAL TO 3YO PRESERVATIVE FREE IM: ICD-10-PCS | Mod: S$GLB,,, | Performed by: INTERNAL MEDICINE

## 2023-12-06 NOTE — PROGRESS NOTES
"Intensive Primary Care Provider Appointment  Shared Note: [facility] & Sandip DILLON)    Subjective:      Patient ID: Radha Kee is a 33 y.o. female.    Chief Complaint: Follow-up      HPI:    HPI  Here for follow-up.   No specific complaints today, except on ROS she admitted to some paresthesias.  More often in her 4th and 5th digits.        Review of Systems  Review of Systems   Constitutional:  Negative for malaise/fatigue.   Respiratory:  Negative for shortness of breath.    Cardiovascular:  Negative for chest pain and palpitations.   Gastrointestinal:  Positive for heartburn.   Genitourinary:  Negative for dysuria and hematuria.   Musculoskeletal:  Negative for joint pain and myalgias.   Neurological:  Positive for tingling (hands) and headaches. Negative for focal weakness.         Objective:     Vital Signs  /88 (BP Location: Right arm, Patient Position: Sitting, BP Method: Medium (Manual))   Pulse 76   Resp 18   Ht 5' 2" (1.575 m)   SpO2 98%   BMI 42.62 kg/m²   Physical Exam  Physical Exam  Vitals and nursing note reviewed.   Constitutional:       Appearance: Normal appearance.   HENT:      Head: Normocephalic.   Eyes:      Conjunctiva/sclera: Conjunctivae normal.   Neck:      Thyroid: No thyromegaly.   Cardiovascular:      Rate and Rhythm: Normal rate and regular rhythm.      Heart sounds: No murmur heard.  Pulmonary:      Effort: Pulmonary effort is normal.      Breath sounds: Normal breath sounds.   Musculoskeletal:      Right lower leg: No edema.      Left lower leg: No edema.   Skin:     General: Skin is warm and dry.   Neurological:      Mental Status: She is alert and oriented to person, place, and time. Mental status is at baseline.   Psychiatric:         Mood and Affect: Mood normal.         Behavior: Behavior normal.         Thought Content: Thought content normal.           Assessment:   33 y.o. female here for primary care visit       Plan:   1. Generalized anxiety " disorder  Overview:  Baseline GAD7 score 11 in 9/2022  Most recent GAD7 score 2 as of 8/4/23    Current regimen:  -Citalopram 10 mg daily      Assessment & Plan:  She feels like her anxiety may be a little higher than last time, but overall stable to much better  Has not seen LCSW since 7/2024 (mostly because she started getting erroneous bills)  But she started seeing a different therapist (one she had seen previously and with whom she had a trusting relationship)  **continue present dose of citalopram      2. Class 3 severe obesity due to excess calories without serious comorbidity with body mass index (BMI) of 40.0 to 44.9 in adult  Overview:  Baseline line weight 106.5 kg (234 lbs) with BMI 43  (Patient prefers not to weigh)    Assessment & Plan:  Started health coaching with Zuleyma back in 8/2023 -- she found it beneficial, but Zuleyma is transitioning out of that role  **will connect her with Randi instead      3. Migraine without aura and without status migrainosus, not intractable  Overview:  2 different phenotypes -- both tension and migraine    Assessment & Plan:  She did have a few headaches over the past few months, but only one of them that she would consider a migraine  She will take 1 or 2 Advil with some relief  Has not tried Cafergot (has an aversion to medication)      4. Gastroesophageal reflux disease without esophagitis  Assessment & Plan:  She usually take omeprazole once or twice a week      5. MACARENA (obstructive sleep apnea)  Assessment & Plan:  She is wearing CPAP ~5 out of 7 days  She does note improved fatigue and higher energy level since starting it      6. Ulnar neuropathy of both upper extremities  Assessment & Plan:  Typically happening with arms in bent position or with elbow leaning on car window sill  Reassurance, advised her to straighten her arm when it happens  Discussed ergonomic positioning at work as well       7. Healthcare maintenance  Assessment & Plan:  Due for Tdap  booster    Orders:  -     (In Office Administered) Tdap Vaccine      Follow up in about 6 months (around 6/8/2024).    Sandip Bear MD/BHARATI  Internal Medicine  [facility]

## 2023-12-06 NOTE — ASSESSMENT & PLAN NOTE
Started health coaching with Larisarebeca back in 8/2023 -- she found it beneficial, but Zuleyma is transitioning out of that role  **will connect her with Randi crowe

## 2023-12-08 ENCOUNTER — OFFICE VISIT (OUTPATIENT)
Dept: PRIMARY CARE CLINIC | Facility: CLINIC | Age: 33
End: 2023-12-08
Attending: INTERNAL MEDICINE
Payer: COMMERCIAL

## 2023-12-08 VITALS
OXYGEN SATURATION: 98 % | BODY MASS INDEX: 42.62 KG/M2 | SYSTOLIC BLOOD PRESSURE: 128 MMHG | DIASTOLIC BLOOD PRESSURE: 88 MMHG | HEART RATE: 76 BPM | HEIGHT: 62 IN | RESPIRATION RATE: 18 BRPM

## 2023-12-08 DIAGNOSIS — F41.1 GENERALIZED ANXIETY DISORDER: Primary | ICD-10-CM

## 2023-12-08 DIAGNOSIS — K21.9 GASTROESOPHAGEAL REFLUX DISEASE WITHOUT ESOPHAGITIS: ICD-10-CM

## 2023-12-08 DIAGNOSIS — G43.009 MIGRAINE WITHOUT AURA AND WITHOUT STATUS MIGRAINOSUS, NOT INTRACTABLE: ICD-10-CM

## 2023-12-08 DIAGNOSIS — G56.23 ULNAR NEUROPATHY OF BOTH UPPER EXTREMITIES: ICD-10-CM

## 2023-12-08 DIAGNOSIS — Z00.00 HEALTHCARE MAINTENANCE: ICD-10-CM

## 2023-12-08 DIAGNOSIS — G47.33 OSA (OBSTRUCTIVE SLEEP APNEA): ICD-10-CM

## 2023-12-08 DIAGNOSIS — E66.01 CLASS 3 SEVERE OBESITY DUE TO EXCESS CALORIES WITHOUT SERIOUS COMORBIDITY WITH BODY MASS INDEX (BMI) OF 40.0 TO 44.9 IN ADULT: ICD-10-CM

## 2023-12-08 PROCEDURE — 3079F PR MOST RECENT DIASTOLIC BLOOD PRESSURE 80-89 MM HG: ICD-10-PCS | Mod: CPTII,S$GLB,, | Performed by: INTERNAL MEDICINE

## 2023-12-08 PROCEDURE — 3079F DIAST BP 80-89 MM HG: CPT | Mod: CPTII,S$GLB,, | Performed by: INTERNAL MEDICINE

## 2023-12-08 PROCEDURE — 99213 OFFICE O/P EST LOW 20 MIN: CPT | Mod: 25,S$GLB,, | Performed by: INTERNAL MEDICINE

## 2023-12-08 PROCEDURE — 3074F PR MOST RECENT SYSTOLIC BLOOD PRESSURE < 130 MM HG: ICD-10-PCS | Mod: CPTII,S$GLB,, | Performed by: INTERNAL MEDICINE

## 2023-12-08 PROCEDURE — 3008F PR BODY MASS INDEX (BMI) DOCUMENTED: ICD-10-PCS | Mod: CPTII,S$GLB,, | Performed by: INTERNAL MEDICINE

## 2023-12-08 PROCEDURE — 90715 TDAP VACCINE GREATER THAN OR EQUAL TO 7YO IM: ICD-10-PCS | Mod: S$GLB,,, | Performed by: INTERNAL MEDICINE

## 2023-12-08 PROCEDURE — 1160F RVW MEDS BY RX/DR IN RCRD: CPT | Mod: CPTII,S$GLB,, | Performed by: INTERNAL MEDICINE

## 2023-12-08 PROCEDURE — 3008F BODY MASS INDEX DOCD: CPT | Mod: CPTII,S$GLB,, | Performed by: INTERNAL MEDICINE

## 2023-12-08 PROCEDURE — 90715 TDAP VACCINE 7 YRS/> IM: CPT | Mod: S$GLB,,, | Performed by: INTERNAL MEDICINE

## 2023-12-08 PROCEDURE — 90471 IMMUNIZATION ADMIN: CPT | Mod: S$GLB,,, | Performed by: INTERNAL MEDICINE

## 2023-12-08 PROCEDURE — 3074F SYST BP LT 130 MM HG: CPT | Mod: CPTII,S$GLB,, | Performed by: INTERNAL MEDICINE

## 2023-12-08 PROCEDURE — 99213 PR OFFICE/OUTPT VISIT, EST, LEVL III, 20-29 MIN: ICD-10-PCS | Mod: 25,S$GLB,, | Performed by: INTERNAL MEDICINE

## 2023-12-08 PROCEDURE — 90471 TDAP VACCINE GREATER THAN OR EQUAL TO 7YO IM: ICD-10-PCS | Mod: S$GLB,,, | Performed by: INTERNAL MEDICINE

## 2023-12-08 PROCEDURE — 1159F PR MEDICATION LIST DOCUMENTED IN MEDICAL RECORD: ICD-10-PCS | Mod: CPTII,S$GLB,, | Performed by: INTERNAL MEDICINE

## 2023-12-08 PROCEDURE — 1160F PR REVIEW ALL MEDS BY PRESCRIBER/CLIN PHARMACIST DOCUMENTED: ICD-10-PCS | Mod: CPTII,S$GLB,, | Performed by: INTERNAL MEDICINE

## 2023-12-08 PROCEDURE — 1159F MED LIST DOCD IN RCRD: CPT | Mod: CPTII,S$GLB,, | Performed by: INTERNAL MEDICINE

## 2023-12-08 NOTE — ASSESSMENT & PLAN NOTE
Typically happening with arms in bent position or with elbow leaning on car window sill  Reassurance, advised her to straighten her arm when it happens  Discussed ergonomic positioning at work as well

## 2023-12-08 NOTE — ASSESSMENT & PLAN NOTE
She did have a few headaches over the past few months, but only one of them that she would consider a migraine  She will take 1 or 2 Advil with some relief  Has not tried Cafergot (has an aversion to medication)

## 2023-12-08 NOTE — ASSESSMENT & PLAN NOTE
She feels like her anxiety may be a little higher than last time, but overall stable to much better  Has not seen LCSW since 7/2024 (mostly because she started getting erroneous bills)  But she started seeing a different therapist (one she had seen previously and with whom she had a trusting relationship)  **continue present dose of citalopram

## 2023-12-08 NOTE — ASSESSMENT & PLAN NOTE
She is wearing CPAP ~5 out of 7 days  She does note improved fatigue and higher energy level since starting it

## 2023-12-08 NOTE — PROGRESS NOTES
After obtaining consent, and per orders of Dr. Bear, injection of Tdap given by JEANNETTE AMADO. Patient instructed to remain in clinic for 15 minutes afterwards, and to report any adverse reaction to staff immediately.Patient tolerated well

## 2023-12-08 NOTE — PATIENT INSTRUCTIONS
PLEASE REMEMBER TO CALLL US FIRST with any medical questions or concerns. We try very hard to keep you out of the emergency room if we are able to see you and help with your concern. It is one of our many ways to keep our patients healthy.   For now the best way to reach us is by calling [facility]'s direct number:  CALL OUR OFFICE AT: [phone]    You can also schedule with us through the portal.   Dr. Ender Soria and KAI Payne are both here and available most days of the week if you need to be seen in person.  I am here every Friday but can also make myself available on other days if necessary as long as I have enough notice.  Please do not ever hesitate to reach to me via the patient portal.      Please let us know if you have any challenges getting scheduled with our behavioral therapist/ or dietitian or if you have any difficulty with any new medication(s) that were prescribed.     Dr. GUTIERREZ

## 2023-12-19 ENCOUNTER — PATIENT MESSAGE (OUTPATIENT)
Dept: PRIMARY CARE CLINIC | Facility: CLINIC | Age: 33
End: 2023-12-19
Payer: COMMERCIAL

## 2024-01-03 ENCOUNTER — TELEPHONE (OUTPATIENT)
Dept: OBSTETRICS AND GYNECOLOGY | Facility: CLINIC | Age: 34
End: 2024-01-03
Payer: COMMERCIAL

## 2024-01-03 NOTE — TELEPHONE ENCOUNTER
Called patient to inform that her procedure has been rescheduled for 02/09/24 at 9:00 am. Patient said thanks.

## 2024-01-16 DIAGNOSIS — K21.9 GASTROESOPHAGEAL REFLUX DISEASE WITHOUT ESOPHAGITIS: ICD-10-CM

## 2024-01-18 DIAGNOSIS — F41.1 GENERALIZED ANXIETY DISORDER: ICD-10-CM

## 2024-01-18 RX ORDER — OMEPRAZOLE 40 MG/1
40 CAPSULE, DELAYED RELEASE ORAL DAILY
Qty: 30 CAPSULE | Refills: 1 | Status: SHIPPED | OUTPATIENT
Start: 2024-01-18 | End: 2024-02-15 | Stop reason: SDUPTHER

## 2024-01-18 RX ORDER — CITALOPRAM 10 MG/1
10 TABLET ORAL DAILY
Qty: 30 TABLET | Refills: 3 | Status: SHIPPED | OUTPATIENT
Start: 2024-01-18 | End: 2025-01-17

## 2024-02-07 ENCOUNTER — PROCEDURE VISIT (OUTPATIENT)
Dept: OBSTETRICS AND GYNECOLOGY | Facility: CLINIC | Age: 34
End: 2024-02-07
Attending: OBSTETRICS & GYNECOLOGY
Payer: COMMERCIAL

## 2024-02-07 VITALS — HEIGHT: 62 IN | BODY MASS INDEX: 42.62 KG/M2

## 2024-02-07 DIAGNOSIS — R87.810 ASCUS WITH POSITIVE HIGH RISK HPV CERVICAL: Primary | ICD-10-CM

## 2024-02-07 DIAGNOSIS — R87.610 ASCUS WITH POSITIVE HIGH RISK HPV CERVICAL: Primary | ICD-10-CM

## 2024-02-07 PROCEDURE — 57454 BX/CURETT OF CERVIX W/SCOPE: CPT | Mod: S$GLB,,, | Performed by: OBSTETRICS & GYNECOLOGY

## 2024-02-07 PROCEDURE — 88305 TISSUE EXAM BY PATHOLOGIST: CPT | Performed by: PATHOLOGY

## 2024-02-07 PROCEDURE — 88305 TISSUE EXAM BY PATHOLOGIST: CPT | Mod: 26,,, | Performed by: PATHOLOGY

## 2024-02-07 NOTE — PROCEDURES
Colposcopy W/BIOPSY AND ECC    Date/Time: 2/7/2024 9:00 AM    Performed by: Delia Valdez MD  Authorized by: Delia Valdez MD    Timeout:Immediately prior to procedure a time out was called to verify the correct patient, procedure, equipment, support staff and site/side marked as required  Prep:Patient was prepped and draped in the usual sterile fashion  Assistants?: No      Colposcopy Site:  Cervix  Position:  Supine  Acrowhite Lesion? Yes    Atypical Vessels: No    Transformation Zone Adequate?: Yes    Biopsy?: Yes         Location:  Cervix ((12 00))  ECC Performed?: Yes    LEEP Performed?: No    Estimated blood loss (cc):  1   Patient tolerated the procedure well with no immediate complications.   Post-operative instructions were provided for the patient.   Patient was discharged and will follow up if any complications occur

## 2024-02-12 LAB
FINAL PATHOLOGIC DIAGNOSIS: NORMAL
GROSS: NORMAL
Lab: NORMAL

## 2024-02-13 DIAGNOSIS — K21.9 GASTROESOPHAGEAL REFLUX DISEASE WITHOUT ESOPHAGITIS: ICD-10-CM

## 2024-02-15 RX ORDER — OMEPRAZOLE 40 MG/1
40 CAPSULE, DELAYED RELEASE ORAL
Qty: 90 CAPSULE | Refills: 1 | Status: SHIPPED | OUTPATIENT
Start: 2024-02-15

## 2024-05-15 NOTE — PROGRESS NOTES
"Chief Complaint: AUB     HPI:      Radha is a 33 y.o.  who presents today for abnormal uterine bleeding.      Menses are irregular. Patient's last menstrual period was 2024.   Typically cycles are every 32-36 days. Will get 10 periods/years. She will bleed for 4 days with 2 heavy flow days, then bleeding will taper out and she will have small clots the size of her pinky nail.   Recently she had no period since 2/15 and then started bleeding on . First few days were a normal flow and then for 2 days having heavy flow with very large clots. The bleeding today has been light but still having cramping.     Has not been sexually active since 2023. She is currently using no method for contraception.     Previous Pap: NILM, HPV positive, other (2023)  JUDI 1 (2024)   HPV positive, other (, , )    Had negative GC/CT screening in 2023.    Physical Exam:      PHYSICAL EXAM:  Ht 5' 2" (1.575 m)   LMP 2024   BMI 42.62 kg/m²   Body mass index is 42.62 kg/m².     APPEARANCE: Well nourished, well developed, in no acute distress.  PELVIC:  Deferred until f/u appt for EMB    Assessment/Plan:     Abnormal uterine bleeding  -     POCT urine pregnancy  -     US Pelvis Comp with Transvag NON-OB (xpd; Future; Expected date: 2024  -     DHEA-Sulfate; Future; Expected date: 2024  -     Luteinizing Hormone; Future; Expected date: 2024  -     Estradiol; Future; Expected date: 2024  -     Follicle Stimulating Hormone; Future; Expected date: 2024  -     T4, Free; Future; Expected date: 2024  -     Testosterone; Future; Expected date: 2024  -     TSH; Future; Expected date: 2024  -     Prolactin; Future; Expected date: 2024  -     17-Hydroxyprogesterone; Future; Expected date: 2024  -     CBC Auto Differential; Future; Expected date: 2024      PLAN:    Here today due to oligomenorrhea (chronic) and menometrorrhagia (acute) which now " appears to be resolving.   Patient was counseled today on the possible causes of AUB including fibroids, polyps, adenomyosis, endometriosis, ovulatory dysfunction, endometrial hyperplasia.  We discussed the need for proper evaluation including lab work to evaluate for etiology of ovulatory dysfunction as well as anemia related to the AUB and TVUS to r/o structural etiology.   We discussed that endometrial tissue sampling should be performed.  We discussed the various treatment options and patient is considering cyclic Provera to ensure she has a cycle every 1-2 months. Will discuss further at her follow-up appointment.       Follow up for pelvic u/s and EMB.

## 2024-05-16 ENCOUNTER — OFFICE VISIT (OUTPATIENT)
Dept: OBSTETRICS AND GYNECOLOGY | Facility: CLINIC | Age: 34
End: 2024-05-16
Payer: COMMERCIAL

## 2024-05-16 VITALS — HEIGHT: 62 IN | BODY MASS INDEX: 42.62 KG/M2

## 2024-05-16 DIAGNOSIS — N93.9 ABNORMAL UTERINE BLEEDING: Primary | ICD-10-CM

## 2024-05-16 LAB
B-HCG UR QL: NEGATIVE
CTP QC/QA: YES

## 2024-05-16 PROCEDURE — 3008F BODY MASS INDEX DOCD: CPT | Mod: CPTII,S$GLB,, | Performed by: NURSE PRACTITIONER

## 2024-05-16 PROCEDURE — 1159F MED LIST DOCD IN RCRD: CPT | Mod: CPTII,S$GLB,, | Performed by: NURSE PRACTITIONER

## 2024-05-16 PROCEDURE — 99999 PR PBB SHADOW E&M-EST. PATIENT-LVL III: CPT | Mod: PBBFAC,,, | Performed by: NURSE PRACTITIONER

## 2024-05-16 PROCEDURE — 81025 URINE PREGNANCY TEST: CPT | Mod: S$GLB,,, | Performed by: NURSE PRACTITIONER

## 2024-05-16 PROCEDURE — 99214 OFFICE O/P EST MOD 30 MIN: CPT | Mod: S$GLB,,, | Performed by: NURSE PRACTITIONER

## 2024-05-17 ENCOUNTER — HOSPITAL ENCOUNTER (OUTPATIENT)
Dept: RADIOLOGY | Facility: HOSPITAL | Age: 34
Discharge: HOME | End: 2024-05-17
Attending: NURSE PRACTITIONER
Payer: COMMERCIAL

## 2024-05-17 DIAGNOSIS — N93.9 ABNORMAL UTERINE BLEEDING: ICD-10-CM

## 2024-05-17 PROCEDURE — 76830 TRANSVAGINAL US NON-OB: CPT | Mod: 26,,, | Performed by: STUDENT IN AN ORGANIZED HEALTH CARE EDUCATION/TRAINING PROGRAM

## 2024-05-17 PROCEDURE — 76856 US EXAM PELVIC COMPLETE: CPT | Mod: TC

## 2024-05-17 PROCEDURE — 76856 US EXAM PELVIC COMPLETE: CPT | Mod: 26,,, | Performed by: STUDENT IN AN ORGANIZED HEALTH CARE EDUCATION/TRAINING PROGRAM

## 2024-05-27 ENCOUNTER — PROCEDURE VISIT (OUTPATIENT)
Dept: OBSTETRICS AND GYNECOLOGY | Facility: CLINIC | Age: 34
End: 2024-05-27
Payer: COMMERCIAL

## 2024-05-27 VITALS — SYSTOLIC BLOOD PRESSURE: 121 MMHG | HEIGHT: 62 IN | DIASTOLIC BLOOD PRESSURE: 87 MMHG | BODY MASS INDEX: 42.62 KG/M2

## 2024-05-27 DIAGNOSIS — Z11.3 ROUTINE SCREENING FOR STI (SEXUALLY TRANSMITTED INFECTION): ICD-10-CM

## 2024-05-27 DIAGNOSIS — N93.9 ABNORMAL UTERINE BLEEDING: Primary | ICD-10-CM

## 2024-05-27 DIAGNOSIS — Z01.812 PRE-PROCEDURE LAB EXAM: ICD-10-CM

## 2024-05-27 LAB
B-HCG UR QL: NEGATIVE
CTP QC/QA: YES

## 2024-05-27 PROCEDURE — 99499 UNLISTED E&M SERVICE: CPT | Mod: S$GLB,,, | Performed by: NURSE PRACTITIONER

## 2024-05-27 PROCEDURE — 81025 URINE PREGNANCY TEST: CPT | Mod: S$GLB,,, | Performed by: NURSE PRACTITIONER

## 2024-05-27 PROCEDURE — 58100 BIOPSY OF UTERUS LINING: CPT | Mod: S$GLB,,, | Performed by: NURSE PRACTITIONER

## 2024-05-27 PROCEDURE — 88305 TISSUE EXAM BY PATHOLOGIST: CPT | Performed by: PATHOLOGY

## 2024-05-27 PROCEDURE — 87591 N.GONORRHOEAE DNA AMP PROB: CPT | Performed by: NURSE PRACTITIONER

## 2024-05-27 PROCEDURE — 88305 TISSUE EXAM BY PATHOLOGIST: CPT | Mod: 26,,, | Performed by: PATHOLOGY

## 2024-05-27 RX ORDER — MEDROXYPROGESTERONE ACETATE 10 MG/1
10 TABLET ORAL DAILY
Qty: 30 TABLET | Refills: 2 | Status: SHIPPED | OUTPATIENT
Start: 2024-05-27 | End: 2024-06-06

## 2024-05-27 NOTE — PROCEDURES
HPI:     Here today to follow-up on AUB which has now resolved.     Menses are irregular. Patient's last menstrual period was 05/09/2024.   Typically cycles are every 32-36 days. Will get 10 periods/years. She will bleed for 4 days with 2 heavy flow days, then bleeding will taper out and she will have small clots the size of her pinky nail.   Recently she had no period since 2/15 and then started bleeding on 5/9. First few days were a normal flow and then for 2 days having heavy flow with very large clots. Bleeding has stopped     Previous Pap: NILM, HPV positive, other (8/25/2023)  JUDI 1 (2/2024)   HPV positive, other (2022, 2021, 2020)     Had negative GC/CT screening in 8/2023. Does endorse new recent sexual partner since last being screened.     Has yet to complete labwork.   Recent pelvic ultrasound  with no significant abnormality.       Endometrial biopsy    Date/Time: 5/27/2024 1:40 PM    Performed by: Cristal Fisher NP  Authorized by: Cristal Fisher NP    Consent:     Consent obtained:  Prior to procedure the appropriate consent was completed and verified    Consent given by:  Patient    Patient questions answered: yes      Patient agrees, verbalizes understanding, and wants to proceed: yes      Educational handouts given: no      Instructions and paperwork completed: yes    Indication:     Indications: Menorrhagia    Pre-procedure:     Pre-procedure timeout performed: yes    Procedure:     Procedure: endometrial biopsy with Pipelle      Cervix cleaned and prepped: yes      A paracervical block was performed: no      An intracervical block was performed: no      The cervix was dilated: no      Uterus sounded: yes      Uterus sound depth (cm):  8    Specimen collected: specimen collected and sent to pathology      Patient tolerated procedure well with no complications: yes (mild vasovagal response)      PLAN:     EMB performed in office today.  The importance of keeping follow-up appointments was  discussed.  For cramping NSAIDs or Tylenol were recommended.  Patient advised that she may resume normal activities, including tampon use and intercourse, as soon as she feels ready.   Patient informed that spotting to mild bleeding is to be expected following endometrial biopsy.  Patient instructed to call the office for heavy bleeding, significant pain, or a  foul-smelling vaginal discharge.  GC/CT screening collected.   We previously discussed the various treatment options for her AUB and patient has opted for cyclic Provera to ensure she has a cycle every at least every 5 weeks.

## 2024-05-29 LAB
C TRACH DNA SPEC QL NAA+PROBE: NOT DETECTED
FINAL PATHOLOGIC DIAGNOSIS: NORMAL
GROSS: NORMAL
Lab: NORMAL
N GONORRHOEA DNA SPEC QL NAA+PROBE: NOT DETECTED

## 2024-06-04 NOTE — PROGRESS NOTES
Intensive Primary Care Provider Appointment  Shared Note: [facility] & Sandip DILLON)    Subjective:      Patient ID: Radha Kee is a 34 y.o. female.    Chief Complaint: No chief complaint on file.      HPI:      HPI  Here for 6-month F/U.  She is doing well and expresses a desire to explore various options for weight management.   We had a long discussion about the various alternatives including the new digital weight management program, as well as phentermine/topiramate, bupropion/naltrexone, and orlistat.      She is leaning toward bupropion/naltrexone because she doesn't want any medication that might worsen her anxiety.  However, she has been doing quite well from her anxiety perspective.  She has been seeing a therapist and they have been revisiting childhood trauma and she feels like she is making great progress.      She would like to proceed with a trial of bupropion/naltrexone.      No recent headaches    No acute concerns or problems.  Although she has been having some more frequent heartburn and has been using the omeprazole more regularly.          No data to display                  8/25/2023     8:47 AM 8/4/2023    10:05 AM 7/21/2023     9:37 AM   GAD7   1. Feeling nervous, anxious, or on edge? 0 0 0   2. Not being able to stop or control worrying? 0 0 1   3. Worrying too much about different things? 1 0 1   4. Trouble relaxing? 0 1 1   5. Being so restless that it is hard to sit still? 0 1 0   6. Becoming easily annoyed or irritable? 1 0 0   7. Feeling afraid as if something awful might happen? 1 0 0   8. If you checked off any problems, how difficult have these problems made it for you to do your work, take care of things at home, or get along with other people? 1 0 1   MITESH-7 Score 3 2 3       Social History     Socioeconomic History    Marital status: Single   Occupational History    Occupation: Student   Tobacco Use    Smoking status: Never    Smokeless tobacco: Never   Substance  and Sexual Activity    Alcohol use: Yes     Alcohol/week: 1.0 standard drink of alcohol     Types: 1 Glasses of wine per week     Comment: social    Drug use: Never    Sexual activity: Yes     Partners: Male     Birth control/protection: None, Condom     Comment: condom use occasional; current partner x 5 months   Other Topics Concern    Are you pregnant or think you may be? No    Breast-feeding No       Review of Systems  Review of Systems   Cardiovascular:  Negative for chest pain.   Gastrointestinal:  Positive for heartburn.   Neurological:  Negative for headaches.   Psychiatric/Behavioral:  Negative for depression. The patient is not nervous/anxious.          Objective:   Mental Health Screening  PHQ-9       MITESH-7       Vital Signs  [facility] 05/09/2024   Physical Exam  Physical Exam  Vitals and nursing note reviewed.   Constitutional:       Appearance: Normal appearance.   HENT:      Head: Normocephalic.      Right Ear: Tympanic membrane normal.      Left Ear: Tympanic membrane normal.      Mouth/Throat:      Pharynx: No oropharyngeal exudate.   Eyes:      General: No scleral icterus.     Conjunctiva/sclera: Conjunctivae normal.   Cardiovascular:      Rate and Rhythm: Normal rate and regular rhythm.   Pulmonary:      Effort: Pulmonary effort is normal.      Breath sounds: Normal breath sounds.   Abdominal:      Palpations: Abdomen is soft.      Tenderness: There is no abdominal tenderness.   Musculoskeletal:      Right lower leg: No edema.      Left lower leg: No edema.   Skin:     General: Skin is warm and dry.   Neurological:      Mental Status: She is alert and oriented to person, place, and time. Mental status is at baseline.   Psychiatric:         Mood and Affect: Mood normal.         Behavior: Behavior normal.         Thought Content: Thought content normal.           Assessment:   34 y.o. female here for primary care visit       Plan:   1. Class 3 severe obesity due to excess calories without serious  comorbidity with body mass index (BMI) of 40.0 to 44.9 in adult  Overview:  Baseline line weight 106.5 kg (234 lbs) with BMI 43  (Patient prefers not to weigh)    Assessment & Plan:  **refer to dietitian   **begin bupropion  mg bid  **begin naltrexone 12.5 mg bid x 1st weeks then 25 mg in AM and 12.5 mg in PM for 2 weeks, then 25 mg bid    Orders:  -     buPROPion (WELLBUTRIN SR) 150 MG TBSR 12 hr tablet; Take 1 tablet (150 mg total) by mouth 2 (two) times daily.  Dispense: 60 tablet; Refill: 5  -     naltrexone (DEPADE) 50 mg tablet; Take 1 tablet (50 mg total) by mouth once daily. 12.5 mg (1/4 tablet) twice daily for the first 2 weeks, then increase to 25 mg (1/2 tablet) in the AM and 12.5 mg (1/4 tablet) in the PM for the next 2 weeks, then 25 mg (1/2 tablet) twice daily  Dispense: 30 tablet; Refill: 2  -     Ambulatory referral/consult to Nutrition Services; Future; Expected date: 06/14/2024    2. Migraine without aura and without status migrainosus, not intractable  Overview:  2 different phenotypes -- both tension and migraine    Assessment & Plan:  None recently, have been very stable      3. MACARENA (obstructive sleep apnea)  Assessment & Plan:  She is wearing CPAP 4 to 5 nights per week      4. Generalized anxiety disorder  Overview:  Baseline GAD7 score 11 in 9/2022  Most recent GAD7 score 2 as of 8/4/23    Current regimen:  -Citalopram 10 mg daily      Assessment & Plan:  Has been doing well, stable  **continue current dose of citalopram        Follow up in about 3 months (around 9/7/2024).    Sandip Bear MD/[facility]  Internal Medicine  [facility]

## 2024-06-07 ENCOUNTER — OFFICE VISIT (OUTPATIENT)
Dept: PRIMARY CARE CLINIC | Facility: CLINIC | Age: 34
End: 2024-06-07
Attending: INTERNAL MEDICINE
Payer: COMMERCIAL

## 2024-06-07 DIAGNOSIS — E66.01 CLASS 3 SEVERE OBESITY DUE TO EXCESS CALORIES WITHOUT SERIOUS COMORBIDITY WITH BODY MASS INDEX (BMI) OF 40.0 TO 44.9 IN ADULT: Primary | ICD-10-CM

## 2024-06-07 DIAGNOSIS — G43.009 MIGRAINE WITHOUT AURA AND WITHOUT STATUS MIGRAINOSUS, NOT INTRACTABLE: ICD-10-CM

## 2024-06-07 DIAGNOSIS — F41.1 GENERALIZED ANXIETY DISORDER: ICD-10-CM

## 2024-06-07 DIAGNOSIS — G47.33 OSA (OBSTRUCTIVE SLEEP APNEA): ICD-10-CM

## 2024-06-07 PROCEDURE — 99214 OFFICE O/P EST MOD 30 MIN: CPT | Mod: S$GLB,,, | Performed by: INTERNAL MEDICINE

## 2024-06-07 PROCEDURE — 1159F MED LIST DOCD IN RCRD: CPT | Mod: CPTII,S$GLB,, | Performed by: INTERNAL MEDICINE

## 2024-06-07 PROCEDURE — 1160F RVW MEDS BY RX/DR IN RCRD: CPT | Mod: CPTII,S$GLB,, | Performed by: INTERNAL MEDICINE

## 2024-06-07 RX ORDER — NALTREXONE HYDROCHLORIDE 50 MG/1
50 TABLET, FILM COATED ORAL DAILY
Qty: 30 TABLET | Refills: 2 | Status: SHIPPED | OUTPATIENT
Start: 2024-06-07 | End: 2024-09-05

## 2024-06-07 RX ORDER — BUPROPION HYDROCHLORIDE 150 MG/1
150 TABLET, EXTENDED RELEASE ORAL 2 TIMES DAILY
Qty: 60 TABLET | Refills: 5 | Status: SHIPPED | OUTPATIENT
Start: 2024-06-07 | End: 2025-06-07

## 2024-06-07 NOTE — ASSESSMENT & PLAN NOTE
**refer to dietitian   **begin bupropion  mg bid  **begin naltrexone 12.5 mg bid x 1st weeks then 25 mg in AM and 12.5 mg in PM for 2 weeks, then 25 mg bid

## 2024-06-07 NOTE — PATIENT INSTRUCTIONS
Keeping a food and activity log    Successfully losing significant weight and keeping it off is one of the hardest things that a person can do.  Difficulty losing weight is complex and influenced by multiple variables including genetic, environmental, social, cultural, metabolic, and behavioral factors.  Our goal will be to understand these factors as they relate to you and your health improvement goals as deeply and completely as possible.      To that end it will be very important for us to understand 2 of the most important variables that can impact your success in achieving any weight loss goals:  the number of calories you take in daily through food and drink versus the number of calories expended daily through activity.  It will be incredibly helpful for us to know exactly what you are eating/drinking, how much you are eating/drinking, and how often for each.  In this case both the types and the amounts of calories matter.       You could keep a written food log of this information where you write down everything you eat or drink, as well as how often and how much.  But we find that it may be even easier for you to use your phone to take a photo of everything you or drink.  There is an more known as AppFirst (available from the [employer] store) that enables you take photos of everything you eat or drink, and it will automatically record the time of the photo.  Another more is My Fitness Pal, which can sync to your smart watch or other wearable device (and enable you to keep track of how active you have been).  Your health  or dietitian can work with you to help you learn how to use these apps to help provide as much information as possible for our team to help you achieve your health improvement goals.             evidanzanap more  My Fitness Pal more

## 2024-07-23 ENCOUNTER — PATIENT MESSAGE (OUTPATIENT)
Dept: INTERNAL MEDICINE | Facility: CLINIC | Age: 34
End: 2024-07-23
Payer: COMMERCIAL

## 2024-08-25 DIAGNOSIS — K21.9 GASTROESOPHAGEAL REFLUX DISEASE WITHOUT ESOPHAGITIS: ICD-10-CM

## 2024-08-26 RX ORDER — OMEPRAZOLE 40 MG/1
40 CAPSULE, DELAYED RELEASE ORAL
Qty: 90 CAPSULE | Refills: 1 | Status: SHIPPED | OUTPATIENT
Start: 2024-08-26

## 2024-09-17 ENCOUNTER — TELEPHONE (OUTPATIENT)
Dept: PRIMARY CARE CLINIC | Facility: CLINIC | Age: 34
End: 2024-09-17
Payer: COMMERCIAL

## 2024-09-17 DIAGNOSIS — G47.33 OSA (OBSTRUCTIVE SLEEP APNEA): Primary | ICD-10-CM

## 2024-09-17 NOTE — TELEPHONE ENCOUNTER
----- Message from Judith Tony sent at 9/16/2024  1:28 PM CDT -----  Contact: [facility]/Ray/[phone]  1MEDICALADVICE     Patient is calling for Medical Advice regarding:status check on a rx     Patient wants a call back or [facility]: call back     Comments:Ray said that he is calling in regards to needing to check the staus of a rx for a Cpap refill he is requesting that the rx be faxed to him : [phone] reference#[id number]    Please advise patient replies from provider may take up to 48 hours.

## 2024-10-04 ENCOUNTER — PATIENT MESSAGE (OUTPATIENT)
Dept: PRIMARY CARE CLINIC | Facility: CLINIC | Age: 34
End: 2024-10-04
Payer: COMMERCIAL

## 2024-12-12 NOTE — PATIENT INSTRUCTIONS
Thank you for visiting me today.    I have sent a Z-Vamshi and prednisone to the [facility] main pharmacy.  Your diagnosis is otitis media on the left or an ear infection as well as conjunctivitis commonly called pink eye.  Please be careful to keep your hands washed and not touch your eyes.  I recommend the following over-the-counter medications for you.  Please do not take anything on your own with a decongestant because of the prednisone.  Cepacol lozenges because your cough is in the immediate upper airway.    Saline nasal spray 4 squirts each nostril as often as every 2-3 hours at bedtime you can use Mucinex DM.    Sleep with the head of your bed up to avoid the nasal drip and the morning cough.  If you are not better please call the office.  
[facility]

## 2025-07-23 NOTE — PROGRESS NOTES
Total [facility] Appointment      Subjective:      Patient ID: Radha Kee is a 35 y.o. female.    Chief Complaint: Follow-up      HPI:    History of Present Illness    CHIEF COMPLAINT:  Radha presents today for follow up.    HEADACHES:  She experiences two types of headaches. Weather-related headaches occur twice weekly, typically in the afternoons. Tension headaches occur approximately once monthly. Migraines begin in the morning and progressively worsen throughout the day, becoming debilitating by afternoon. She describes migraine symptoms including requirement to rest in a dark, quiet room with light and noise sensitivity. Nausea accompanies migraines approximately 50% of the time. Headaches significantly impact daily functioning, often requiring early work departure and rest. She denies previously taking prescribed Imitrex or Cafergot for headache management.    SLEEP APNEA:  She reports consistent CPAP use, utilizing device 25 out of 30 days per month. Despite regular use, she has experienced an increase in sleep apnea events, noting approximately one event per hour even while using CPAP. She suspects potential weight gain may be contributing to recent sleep apnea breakthrough events, experiencing episodes of awakening once per week.    MENSTRUAL HISTORY AND PCOS:  She reports irregular menstrual periods with a history of bilateral ovarian cysts. Prior CT of abdomen/pelvis showed a 2.9 cm cyst on the right ovary and a 1.9 cm cyst on the left ovary. She has noted recent development of facial and inner thigh hair growth over the past two years. She previously underwent evaluation for potential Polycystic Ovarian Syndrome (PCOS), with initial labs suggesting negative results. However, current clinical findings suggest potential PCOS characteristics, including irregular menses, ovarian cysts, and hirsutism.    ANXIETY:  She reports experiencing mild anxiety over several days in the past two weeks, describing  feeling nervous and anxious with some difficulty controlling worrying. The anxiety has not significantly impacted her ability to work, manage home responsibilities, or interact with others. She reports current anxiety levels are less intense compared to her previous experiences. She previously took Celexa for mental health management, using the medication consistently for four to six months before discontinuing. When attempting to restart the medication, she experienced increased anxiety and was unable to continue treatment.      ROS:  General: no fever, no chills, no fatigue, no weight gain, no weight loss  Eyes: no vision changes, no redness, no discharge, reports migraines, reports photophobia, reports blind spots  ENT: no ear pain, no nasal congestion, no sore throat  Cardiovascular: no chest pain, no palpitations, no lower extremity edema  Respiratory: no cough, no shortness of breath, reports intermittent breathing while sleeping, reports apnea  Gastrointestinal: no abdominal pain, reports nausea, no vomiting, no diarrhea, no constipation, no blood in stool  Genitourinary: no dysuria, no hematuria, no frequency  Musculoskeletal: no joint pain, no muscle pain  Skin: no rash, no lesion  Neurological: reports headache, no dizziness, no numbness, no tingling  Psychiatric: reports anxiety, no depression, no sleep difficulty  Female Genitourinary: reports absent or irregular periods       Lab Results   Component Value Date    HGBA1C 5.1 09/06/2022 7/25/2025 5/16/2024 2/7/2024   PHQ-9 Depression Patient Health Questionnaire   Over the last two weeks how often have you been bothered by little interest or pleasure in doing things 0    0 0 0   Over the last two weeks how often have you been bothered by feeling down, depressed or hopeless 0    0 0 0       Multiple values from one day are sorted in reverse-chronological order          7/25/2025     2:47 PM 8/25/2023     8:47 AM 8/4/2023    10:05 AM   GAD7   1.  "Feeling nervous, anxious, or on edge? 1 0 0   2. Not being able to stop or control worrying? 1 0 0   3. Worrying too much about different things? 1 1 0   4. Trouble relaxing? 1 0 1   5. Being so restless that it is hard to sit still? 1 0 1   6. Becoming easily annoyed or irritable? 0 1 0   7. Feeling afraid as if something awful might happen? 1 1 0   8. If you checked off any problems, how difficult have these problems made it for you to do your work, take care of things at home, or get along with other people? 0 1 0   MITESH-7 Score 6 3 2         Objective:   Mental Health Screening  PHQ-9  Depression Patient Health Questionnaire (PHQ-9)  Over the last two weeks how often have you been bothered by little interest or pleasure in doing things: Not at all  Over the last two weeks how often have you been bothered by feeling down, depressed or hopeless: Not at all    MITESH-7  Generalized Anxiety Disorder 7-item (MITESH-7) Scale. HOW OFTEN DURING THE PAST 2 WEEKS HAVE YOU FELT BOTHERED BY:  1. Feeling nervous, anxious, or on edge?: Several days  2. Not being able to stop or control worrying?: Several days  3. Worrying too much about different things?: Several days  4. Trouble relaxing?: Several days  5. Being so restless that it is hard to sit still?: Several days  6. Becoming easily annoyed or irritable?: Not at all  7. Feeling afraid as if something awful might happen?: Several days  8. If you checked off any problems, how difficult have these problems made it for you to do your work, take care of things at home, or get along with other people?: Not difficult at all  MITESH-7 Score: 6  Number answered (out of first 7): 7  Interpretation: Mild Anxiety    PHYSICAL EXAM   Vital Signs  /85 (Patient Position: Sitting)   Pulse 106   Temp 98 °F (36.7 °C)   Ht 5' 2" (1.575 m)   Wt 120 kg (264 lb 7.1 oz)   SpO2 95%   BMI 48.37 kg/m²     Physical Exam    General: Well-developed. Well-nourished. No acute distress.  Eyes: EOMI. " Sclerae anicteric.  HENT: Normocephalic. Atraumatic. Nares patent. Moist oral mucosa.  Cardiovascular: Regular rate. Regular rhythm. No murmurs. No rubs. No gallops. Normal S1, S2.  Respiratory: Normal respiratory effort. Clear to auscultation bilaterally. No rales. No rhonchi. No wheezing.  Musculoskeletal: No  obvious deformity.  Extremities: No lower extremity edema.  Neurological: Alert & oriented x3. No slurred speech. Normal gait.  Psychiatric: Normal mood. Normal affect. Good insight. Good judgment.  Skin: Warm. Dry. No rash.       Assessment:   35 y.o. female here for primary care visit       Plan:   1. Migraine without aura and without status migrainosus, not intractable  Overview:  2 different phenotypes -- both tension and migraine    Assessment & Plan:  Only having about one tension HA per month  But she is experiencing more frequent migraines, as often as twice weekly, usually during changes in weather  --she describes classic migraine, lasting several hours, debilitating, with nausea, photo- and phonophobia, usually alleviated by lying down in a dark, quiet room  **trial of Rizatriptan 10 mg at earliest onset    Orders:  -     rizatriptan (MAXALT-MLT) 10 MG disintegrating tablet; Take 1 tablet (10 mg total) by mouth as needed for Migraine. May repeat in 2 hours if needed  Dispense: 9 tablet; Refill: 0    2. Generalized anxiety disorder  Overview:  Baseline GAD7 score 11 in 9/2022  GAD7 score 2 as of 8/4/23  GAD7 score 6 as of 7/25/25    Assessment & Plan:  She reports taking the citalopram about 4 to 6 months consistently before stopping  --GAD7 today indicates mild anxiety that does not appear to be interfering with social or occupational activities  --she has an extreme aversion to medications which has resulted in failure to take medications that otherwise might help her to achieve her health related goals  **she is willing to see our behavioral therapistToby    Orders:  -     Ambulatory  referral/consult to [facility]; Future; Expected date: 08/01/2025    3. Class 3 severe obesity due to excess calories without serious comorbidity with body mass index (BMI) of 45.0 to 49.9 in adult  Overview:  Baseline line weight 106.5 kg (234 lbs) with BMI 43  Weight 120 kg (264 lbs) with BMI 48.4    Assessment & Plan:  She never tried the Wellbutrin or naltrexone (mostly due to fear of medication)  **discussed the digital medicine weight management program  **need CBC and CMP to calculate Fib4 score    Orders:  -     CBC Auto Differential; Future; Expected date: 07/25/2025  -     Comprehensive Metabolic Panel; Future; Expected date: 07/25/2025  -     Hemoglobin A1C; Future; Expected date: 07/25/2025    4. MACARENA (obstructive sleep apnea)  Assessment & Plan:  She is wearing her CPAP at least 25 our of 30 days of every month      5. Hirsutism  Assessment & Plan:  --some mild facial hirsutism and also reports some hair growth on inner thighs  --has a history of irregular menstrual periods for the past 10 years  --CT of abdomen pelvis from 2021 does note history of 1 ovarian cyst in left ovary and 1 cyst on right ovary  --possible PCOS           Follow up in about 3 months (around 10/25/2025).    Sandip Bear MD/[facility] [place]  [place] Medicine  [facility]

## 2025-07-25 ENCOUNTER — OFFICE VISIT (OUTPATIENT)
Dept: PRIMARY CARE CLINIC | Facility: CLINIC | Age: 35
End: 2025-07-25
Attending: INTERNAL MEDICINE
Payer: COMMERCIAL

## 2025-07-25 ENCOUNTER — PATIENT MESSAGE (OUTPATIENT)
Dept: PRIMARY CARE CLINIC | Facility: CLINIC | Age: 35
End: 2025-07-25

## 2025-07-25 VITALS
OXYGEN SATURATION: 95 % | HEART RATE: 106 BPM | HEIGHT: 62 IN | BODY MASS INDEX: 48.66 KG/M2 | DIASTOLIC BLOOD PRESSURE: 85 MMHG | WEIGHT: 264.44 LBS | TEMPERATURE: 98 F | SYSTOLIC BLOOD PRESSURE: 139 MMHG

## 2025-07-25 DIAGNOSIS — E66.813 CLASS 3 SEVERE OBESITY DUE TO EXCESS CALORIES WITHOUT SERIOUS COMORBIDITY WITH BODY MASS INDEX (BMI) OF 45.0 TO 49.9 IN ADULT: ICD-10-CM

## 2025-07-25 DIAGNOSIS — G43.009 MIGRAINE WITHOUT AURA AND WITHOUT STATUS MIGRAINOSUS, NOT INTRACTABLE: Primary | ICD-10-CM

## 2025-07-25 DIAGNOSIS — G47.33 OSA (OBSTRUCTIVE SLEEP APNEA): ICD-10-CM

## 2025-07-25 DIAGNOSIS — F41.1 GENERALIZED ANXIETY DISORDER: ICD-10-CM

## 2025-07-25 DIAGNOSIS — L68.0 HIRSUTISM: ICD-10-CM

## 2025-07-25 RX ORDER — RIZATRIPTAN BENZOATE 10 MG/1
10 TABLET, ORALLY DISINTEGRATING ORAL
Qty: 9 TABLET | Refills: 0 | Status: SHIPPED | OUTPATIENT
Start: 2025-07-25 | End: 2025-08-24

## 2025-07-25 NOTE — PATIENT INSTRUCTIONS
Patients go to [email or web address] - there they validate their financial eligibility (attribution), select the program of interest (weight management), screen for primary exclusions, and consent to Digital Medicine. After the portal, they immediately start onboarding for [facility]. Once onboarding, they can download the DM more and submit their first reading to complete enrollment. After the first reading is submitting in the DM more, they'll be eligible to schedule a virtual visit for medication management.

## 2025-07-25 NOTE — ASSESSMENT & PLAN NOTE
She reports taking the citalopram about 4 to 6 months consistently before stopping  --GAD7 today indicates mild anxiety that does not appear to be interfering with social or occupational activities  --she has an extreme aversion to medications which has resulted in failure to take medications that otherwise might help her to achieve her health related goals  **she is willing to see our behavioral therapist, Toby

## 2025-07-25 NOTE — ASSESSMENT & PLAN NOTE
Only having about one tension HA per month  But she is experiencing more frequent migraines, as often as twice weekly, usually during changes in weather  --she describes classic migraine, lasting several hours, debilitating, with nausea, photo- and phonophobia, usually alleviated by lying down in a dark, quiet room  **trial of Rizatriptan 10 mg at earliest onset

## 2025-07-25 NOTE — ASSESSMENT & PLAN NOTE
She never tried the Wellbutrin or naltrexone (mostly due to fear of medication)  **discussed the digital medicine weight management program  **need CBC and CMP to calculate Fib4 score

## 2025-07-25 NOTE — ASSESSMENT & PLAN NOTE
--some mild facial hirsutism and also reports some hair growth on inner thighs  --has a history of irregular menstrual periods for the past 10 years  --CT of abdomen pelvis from 2021 does note history of 1 ovarian cyst in left ovary and 1 cyst on right ovary  --possible PCOS